# Patient Record
Sex: MALE | Race: WHITE | Employment: FULL TIME | URBAN - METROPOLITAN AREA
[De-identification: names, ages, dates, MRNs, and addresses within clinical notes are randomized per-mention and may not be internally consistent; named-entity substitution may affect disease eponyms.]

---

## 2019-01-02 ENCOUNTER — HOSPITAL ENCOUNTER (OUTPATIENT)
Dept: SURGERY | Age: 59
Discharge: HOME | End: 2019-01-02
Payer: COMMERCIAL

## 2019-01-02 VITALS
WEIGHT: 203 LBS | DIASTOLIC BLOOD PRESSURE: 84 MMHG | RESPIRATION RATE: 17 BRPM | BODY MASS INDEX: 24.72 KG/M2 | OXYGEN SATURATION: 95 % | TEMPERATURE: 98.4 F | HEIGHT: 76 IN | SYSTOLIC BLOOD PRESSURE: 125 MMHG

## 2019-01-02 LAB
ANION GAP SERPL CALC-SCNC: 6 MMOL/L (ref 7–16)
BACTERIA SPEC CULT: ABNORMAL
BUN SERPL-MCNC: 12 MG/DL (ref 6–23)
CALCIUM SERPL-MCNC: 8.7 MG/DL (ref 8.3–10.4)
CHLORIDE SERPL-SCNC: 107 MMOL/L (ref 98–107)
CO2 SERPL-SCNC: 26 MMOL/L (ref 21–32)
CREAT SERPL-MCNC: 0.92 MG/DL (ref 0.8–1.5)
ERYTHROCYTE [DISTWIDTH] IN BLOOD BY AUTOMATED COUNT: 11.9 % (ref 11.9–14.6)
GLUCOSE SERPL-MCNC: 98 MG/DL (ref 65–100)
HCT VFR BLD AUTO: 41.1 % (ref 41.1–50.3)
HGB BLD-MCNC: 13.8 G/DL (ref 13.6–17.2)
MCH RBC QN AUTO: 31.2 PG (ref 26.1–32.9)
MCHC RBC AUTO-ENTMCNC: 33.6 G/DL (ref 31.4–35)
MCV RBC AUTO: 92.8 FL (ref 79.6–97.8)
NRBC # BLD: 0 K/UL (ref 0–0.2)
PLATELET # BLD AUTO: 245 K/UL (ref 150–450)
PMV BLD AUTO: 9 FL (ref 9.4–12.3)
POTASSIUM SERPL-SCNC: 4.1 MMOL/L (ref 3.5–5.1)
RBC # BLD AUTO: 4.43 M/UL (ref 4.23–5.6)
SERVICE CMNT-IMP: ABNORMAL
SODIUM SERPL-SCNC: 139 MMOL/L (ref 136–145)
WBC # BLD AUTO: 6.4 K/UL (ref 4.3–11.1)

## 2019-01-02 PROCEDURE — 87641 MR-STAPH DNA AMP PROBE: CPT

## 2019-01-02 PROCEDURE — 85027 COMPLETE CBC AUTOMATED: CPT

## 2019-01-02 PROCEDURE — 93005 ELECTROCARDIOGRAM TRACING: CPT | Performed by: ANESTHESIOLOGY

## 2019-01-02 PROCEDURE — 80048 BASIC METABOLIC PNL TOTAL CA: CPT

## 2019-01-02 NOTE — PERIOP NOTES
Patient verified name and  Order for consent not found Type 3 surgery,  assessment complete. Labs per surgeon: none Labs per anesthesia protocol: CBC, BMP, MRSA/MSSA (per hospital protocol), type and screen DOS. EK19 NSR Hibiclens and instructions given per hospital policy. Patient provided with and instructed on educational handouts including Guide to Surgery, Pain Management, Hand Hygiene, Blood Transfusion Education, and [place] [facility] Brochure. Patient answered medical/surgical history questions at their best of ability. All prior to admission medications documented in [facility]. Patient instructed to hold all vitamins 7 days prior to surgery and NSAIDS 5 days prior to surgery, patient verbalized understanding. Medications to be held: none Patient instructed to continue previous medications as prescribed prior to surgery and to take the following medications the day of surgery according to anesthesia guidelines with a small sip of water: none. Patient teach back successful and patient demonstrates knowledge of instructions.

## 2019-01-02 NOTE — PERIOP NOTES
Recent Results (from the past 12 hour(s)) METABOLIC PANEL, BASIC Collection Time: 01/02/19 12:08 PM  
Result Value Ref Range Sodium 139 136 - 145 mmol/L Potassium 4.1 3.5 - 5.1 mmol/L Chloride 107 98 - 107 mmol/L  
 CO2 26 21 - 32 mmol/L Anion gap 6 (L) 7 - 16 mmol/L Glucose 98 65 - 100 mg/dL BUN 12 6 - 23 MG/DL Creatinine 0.92 0.8 - 1.5 MG/DL  
 GFR est AA >60 >60 ml/min/1.73m2 GFR est non-AA >60 >60 ml/min/1.73m2 Calcium 8.7 8.3 - 10.4 MG/DL  
CBC W/O DIFF Collection Time: 01/02/19 12:08 PM  
Result Value Ref Range WBC 6.4 4.3 - 11.1 K/uL  
 RBC 4.43 4.23 - 5.6 M/uL  
 HGB 13.8 13.6 - 17.2 g/dL HCT 41.1 41.1 - 50.3 % MCV 92.8 79.6 - 97.8 FL  
 MCH 31.2 26.1 - 32.9 PG  
 MCHC 33.6 31.4 - 35.0 g/dL  
 RDW 11.9 11.9 - 14.6 % PLATELET 275 344 - 894 K/uL MPV 9.0 (L) 9.4 - 12.3 FL ABSOLUTE NRBC 0.00 0.0 - 0.2 K/uL MSSA/MRSA SC BY PCR, NASAL SWAB Collection Time: 01/02/19 12:08 PM  
Result Value Ref Range Special Requests: NO SPECIAL REQUESTS Culture result: (A) MRSA target DNA not detected, SA target DNA detected. A MRSA negative, SA positive test result does not preclude MRSA nasal colonization. Labs reviewed

## 2019-01-02 NOTE — PERIOP NOTES
Nasal swab resulted MSSA. Called Mupirocin to [facility] pharmacy in [facility] on [address]. Called patient back and explained to administer to each nostril twice daily for 5 days. Patient verbalized understanding.

## 2019-01-03 LAB
ATRIAL RATE: 61 BPM
CALCULATED P AXIS, ECG09: 77 DEGREES
CALCULATED R AXIS, ECG10: 75 DEGREES
CALCULATED T AXIS, ECG11: 63 DEGREES
DIAGNOSIS, 93000: NORMAL
P-R INTERVAL, ECG05: 160 MS
Q-T INTERVAL, ECG07: 404 MS
QRS DURATION, ECG06: 88 MS
QTC CALCULATION (BEZET), ECG08: 406 MS
VENTRICULAR RATE, ECG03: 61 BPM

## 2019-01-06 ENCOUNTER — ANESTHESIA EVENT (OUTPATIENT)
Dept: SURGERY | Age: 59
DRG: 470 | End: 2019-01-06
Payer: COMMERCIAL

## 2019-01-06 PROBLEM — M16.9 DEGENERATIVE JOINT DISEASE (DJD) OF HIP: Status: ACTIVE | Noted: 2019-01-06

## 2019-01-07 ENCOUNTER — APPOINTMENT (OUTPATIENT)
Dept: GENERAL RADIOLOGY | Age: 59
DRG: 470 | End: 2019-01-07
Attending: ORTHOPAEDIC SURGERY
Payer: COMMERCIAL

## 2019-01-07 ENCOUNTER — ANESTHESIA (OUTPATIENT)
Dept: SURGERY | Age: 59
DRG: 470 | End: 2019-01-07
Payer: COMMERCIAL

## 2019-01-07 ENCOUNTER — HOSPITAL ENCOUNTER (INPATIENT)
Age: 59
LOS: 2 days | Discharge: HOME HEALTH | DRG: 470 | End: 2019-01-09
Attending: ORTHOPAEDIC SURGERY | Admitting: ORTHOPAEDIC SURGERY
Payer: COMMERCIAL

## 2019-01-07 LAB
ABO + RH BLD: NORMAL
BLOOD BANK CMNT PATIENT-IMP: NORMAL
BLOOD GROUP ANTIBODIES SERPL: NORMAL
INR PPP: 1
PROTHROMBIN TIME: 13.5 SEC (ref 11.7–14.5)
SPECIMEN EXP DATE BLD: NORMAL

## 2019-01-07 PROCEDURE — 77030006835 HC BLD SAW SAG STRY -B: Performed by: ORTHOPAEDIC SURGERY

## 2019-01-07 PROCEDURE — 77030002922 HC SUT FBRWRE ARTH -B: Performed by: ORTHOPAEDIC SURGERY

## 2019-01-07 PROCEDURE — 72170 X-RAY EXAM OF PELVIS: CPT

## 2019-01-07 PROCEDURE — 74011250636 HC RX REV CODE- 250/636: Performed by: ANESTHESIOLOGY

## 2019-01-07 PROCEDURE — 97161 PT EVAL LOW COMPLEX 20 MIN: CPT

## 2019-01-07 PROCEDURE — 77030003665 HC NDL SPN BBMI -A: Performed by: ANESTHESIOLOGY

## 2019-01-07 PROCEDURE — 0SR90JA REPLACEMENT OF RIGHT HIP JOINT WITH SYNTHETIC SUBSTITUTE, UNCEMENTED, OPEN APPROACH: ICD-10-PCS | Performed by: ORTHOPAEDIC SURGERY

## 2019-01-07 PROCEDURE — 77030013727 HC IRR FAN PULSVC ZIMM -B: Performed by: ORTHOPAEDIC SURGERY

## 2019-01-07 PROCEDURE — 74011000250 HC RX REV CODE- 250: Performed by: ORTHOPAEDIC SURGERY

## 2019-01-07 PROCEDURE — 86900 BLOOD TYPING SEROLOGIC ABO: CPT

## 2019-01-07 PROCEDURE — 74011250636 HC RX REV CODE- 250/636

## 2019-01-07 PROCEDURE — 74011250637 HC RX REV CODE- 250/637: Performed by: ANESTHESIOLOGY

## 2019-01-07 PROCEDURE — 74011250637 HC RX REV CODE- 250/637: Performed by: ORTHOPAEDIC SURGERY

## 2019-01-07 PROCEDURE — 65270000029 HC RM PRIVATE

## 2019-01-07 PROCEDURE — 77030008467 HC STPLR SKN COVD -B: Performed by: ORTHOPAEDIC SURGERY

## 2019-01-07 PROCEDURE — 77030029883 HC RETRV SUT ARTHSCP HOFFE BEAT -B: Performed by: ORTHOPAEDIC SURGERY

## 2019-01-07 PROCEDURE — 85610 PROTHROMBIN TIME: CPT

## 2019-01-07 PROCEDURE — 77030020782 HC GWN BAIR PAWS FLX 3M -B: Performed by: ANESTHESIOLOGY

## 2019-01-07 PROCEDURE — 77030031139 HC SUT VCRL2 J&J -A: Performed by: ORTHOPAEDIC SURGERY

## 2019-01-07 PROCEDURE — 74011000250 HC RX REV CODE- 250

## 2019-01-07 PROCEDURE — 74011250636 HC RX REV CODE- 250/636: Performed by: ORTHOPAEDIC SURGERY

## 2019-01-07 PROCEDURE — 77030018836 HC SOL IRR NACL ICUM -A: Performed by: ORTHOPAEDIC SURGERY

## 2019-01-07 PROCEDURE — C1776 JOINT DEVICE (IMPLANTABLE): HCPCS | Performed by: ORTHOPAEDIC SURGERY

## 2019-01-07 PROCEDURE — 76010000171 HC OR TIME 2 TO 2.5 HR INTENSV-TIER 1: Performed by: ORTHOPAEDIC SURGERY

## 2019-01-07 PROCEDURE — 76210000006 HC OR PH I REC 0.5 TO 1 HR: Performed by: ORTHOPAEDIC SURGERY

## 2019-01-07 PROCEDURE — 76060000035 HC ANESTHESIA 2 TO 2.5 HR: Performed by: ORTHOPAEDIC SURGERY

## 2019-01-07 PROCEDURE — 77030020263 HC SOL INJ SOD CL0.9% LFCR 1000ML

## 2019-01-07 PROCEDURE — 77030007880 HC KT SPN EPDRL BBMI -B: Performed by: ANESTHESIOLOGY

## 2019-01-07 PROCEDURE — 36415 COLL VENOUS BLD VENIPUNCTURE: CPT

## 2019-01-07 PROCEDURE — 97530 THERAPEUTIC ACTIVITIES: CPT

## 2019-01-07 PROCEDURE — 74011000258 HC RX REV CODE- 258: Performed by: ORTHOPAEDIC SURGERY

## 2019-01-07 DEVICE — COBALT CHROME 12/14 TAPER FEMORAL                                    HEAD 28MM + 0: Type: IMPLANTABLE DEVICE | Site: HIP | Status: FUNCTIONAL

## 2019-01-07 RX ORDER — HYDROCODONE BITARTRATE AND ACETAMINOPHEN 5; 325 MG/1; MG/1
1 TABLET ORAL AS NEEDED
Status: DISCONTINUED | OUTPATIENT
Start: 2019-01-07 | End: 2019-01-07 | Stop reason: HOSPADM

## 2019-01-07 RX ORDER — HYDROMORPHONE HYDROCHLORIDE 1 MG/ML
1 INJECTION, SOLUTION INTRAMUSCULAR; INTRAVENOUS; SUBCUTANEOUS
Status: DISCONTINUED | OUTPATIENT
Start: 2019-01-07 | End: 2019-01-09 | Stop reason: HOSPADM

## 2019-01-07 RX ORDER — ACETAMINOPHEN 500 MG
1000 TABLET ORAL
Status: DISCONTINUED | OUTPATIENT
Start: 2019-01-07 | End: 2019-01-07 | Stop reason: HOSPADM

## 2019-01-07 RX ORDER — PROPOFOL 10 MG/ML
INJECTION, EMULSION INTRAVENOUS AS NEEDED
Status: DISCONTINUED | OUTPATIENT
Start: 2019-01-07 | End: 2019-01-07 | Stop reason: HOSPADM

## 2019-01-07 RX ORDER — ENOXAPARIN SODIUM 100 MG/ML
40 INJECTION SUBCUTANEOUS EVERY 24 HOURS
Status: DISCONTINUED | OUTPATIENT
Start: 2019-01-08 | End: 2019-01-09 | Stop reason: HOSPADM

## 2019-01-07 RX ORDER — HYDROMORPHONE HYDROCHLORIDE 2 MG/ML
0.5 INJECTION, SOLUTION INTRAMUSCULAR; INTRAVENOUS; SUBCUTANEOUS
Status: DISCONTINUED | OUTPATIENT
Start: 2019-01-07 | End: 2019-01-07 | Stop reason: HOSPADM

## 2019-01-07 RX ORDER — SODIUM CHLORIDE 9 MG/ML
50 INJECTION, SOLUTION INTRAVENOUS CONTINUOUS
Status: DISCONTINUED | OUTPATIENT
Start: 2019-01-07 | End: 2019-01-07 | Stop reason: HOSPADM

## 2019-01-07 RX ORDER — AMOXICILLIN 250 MG
2 CAPSULE ORAL DAILY
Status: DISCONTINUED | OUTPATIENT
Start: 2019-01-08 | End: 2019-01-09 | Stop reason: HOSPADM

## 2019-01-07 RX ORDER — LIDOCAINE HYDROCHLORIDE 10 MG/ML
0.1 INJECTION INFILTRATION; PERINEURAL AS NEEDED
Status: DISCONTINUED | OUTPATIENT
Start: 2019-01-07 | End: 2019-01-07 | Stop reason: HOSPADM

## 2019-01-07 RX ORDER — CEFAZOLIN SODIUM/WATER 2 G/20 ML
2 SYRINGE (ML) INTRAVENOUS EVERY 8 HOURS
Status: COMPLETED | OUTPATIENT
Start: 2019-01-07 | End: 2019-01-08

## 2019-01-07 RX ORDER — CELECOXIB 200 MG/1
200 CAPSULE ORAL
Status: COMPLETED | OUTPATIENT
Start: 2019-01-07 | End: 2019-01-07

## 2019-01-07 RX ORDER — PROPOFOL 10 MG/ML
INJECTION, EMULSION INTRAVENOUS
Status: DISCONTINUED | OUTPATIENT
Start: 2019-01-07 | End: 2019-01-07 | Stop reason: HOSPADM

## 2019-01-07 RX ORDER — SODIUM CHLORIDE 0.9 % (FLUSH) 0.9 %
5-40 SYRINGE (ML) INJECTION AS NEEDED
Status: DISCONTINUED | OUTPATIENT
Start: 2019-01-07 | End: 2019-01-09 | Stop reason: HOSPADM

## 2019-01-07 RX ORDER — NALOXONE HYDROCHLORIDE 0.4 MG/ML
.2-.4 INJECTION, SOLUTION INTRAMUSCULAR; INTRAVENOUS; SUBCUTANEOUS
Status: DISCONTINUED | OUTPATIENT
Start: 2019-01-07 | End: 2019-01-09 | Stop reason: HOSPADM

## 2019-01-07 RX ORDER — DIPHENHYDRAMINE HCL 25 MG
25 CAPSULE ORAL
Status: DISCONTINUED | OUTPATIENT
Start: 2019-01-07 | End: 2019-01-09 | Stop reason: HOSPADM

## 2019-01-07 RX ORDER — CELECOXIB 200 MG/1
200 CAPSULE ORAL EVERY 12 HOURS
Status: DISCONTINUED | OUTPATIENT
Start: 2019-01-07 | End: 2019-01-09 | Stop reason: HOSPADM

## 2019-01-07 RX ORDER — SODIUM CHLORIDE 9 MG/ML
100 INJECTION, SOLUTION INTRAVENOUS CONTINUOUS
Status: DISPENSED | OUTPATIENT
Start: 2019-01-07 | End: 2019-01-09

## 2019-01-07 RX ORDER — ASPIRIN 325 MG
325 TABLET, DELAYED RELEASE (ENTERIC COATED) ORAL EVERY 12 HOURS
Status: DISCONTINUED | OUTPATIENT
Start: 2019-01-07 | End: 2019-01-09 | Stop reason: HOSPADM

## 2019-01-07 RX ORDER — SODIUM CHLORIDE 0.9 % (FLUSH) 0.9 %
5-40 SYRINGE (ML) INJECTION EVERY 8 HOURS
Status: DISCONTINUED | OUTPATIENT
Start: 2019-01-07 | End: 2019-01-09 | Stop reason: HOSPADM

## 2019-01-07 RX ORDER — CEFAZOLIN SODIUM/WATER 2 G/20 ML
2 SYRINGE (ML) INTRAVENOUS ONCE
Status: COMPLETED | OUTPATIENT
Start: 2019-01-07 | End: 2019-01-07

## 2019-01-07 RX ORDER — SODIUM CHLORIDE, SODIUM LACTATE, POTASSIUM CHLORIDE, CALCIUM CHLORIDE 600; 310; 30; 20 MG/100ML; MG/100ML; MG/100ML; MG/100ML
125 INJECTION, SOLUTION INTRAVENOUS CONTINUOUS
Status: DISCONTINUED | OUTPATIENT
Start: 2019-01-07 | End: 2019-01-07 | Stop reason: HOSPADM

## 2019-01-07 RX ORDER — FENTANYL CITRATE 50 UG/ML
100 INJECTION, SOLUTION INTRAMUSCULAR; INTRAVENOUS AS NEEDED
Status: DISCONTINUED | OUTPATIENT
Start: 2019-01-07 | End: 2019-01-07 | Stop reason: HOSPADM

## 2019-01-07 RX ORDER — MIDAZOLAM HYDROCHLORIDE 1 MG/ML
2 INJECTION, SOLUTION INTRAMUSCULAR; INTRAVENOUS
Status: COMPLETED | OUTPATIENT
Start: 2019-01-07 | End: 2019-01-07

## 2019-01-07 RX ORDER — DEXAMETHASONE SODIUM PHOSPHATE 100 MG/10ML
10 INJECTION INTRAMUSCULAR; INTRAVENOUS ONCE
Status: COMPLETED | OUTPATIENT
Start: 2019-01-08 | End: 2019-01-08

## 2019-01-07 RX ORDER — FAMOTIDINE 20 MG/1
20 TABLET, FILM COATED ORAL ONCE
Status: COMPLETED | OUTPATIENT
Start: 2019-01-07 | End: 2019-01-07

## 2019-01-07 RX ORDER — SODIUM CHLORIDE, SODIUM LACTATE, POTASSIUM CHLORIDE, CALCIUM CHLORIDE 600; 310; 30; 20 MG/100ML; MG/100ML; MG/100ML; MG/100ML
150 INJECTION, SOLUTION INTRAVENOUS CONTINUOUS
Status: DISCONTINUED | OUTPATIENT
Start: 2019-01-07 | End: 2019-01-07 | Stop reason: HOSPADM

## 2019-01-07 RX ORDER — HYDROCODONE BITARTRATE AND ACETAMINOPHEN 10; 325 MG/1; MG/1
1 TABLET ORAL
Status: DISCONTINUED | OUTPATIENT
Start: 2019-01-07 | End: 2019-01-09 | Stop reason: HOSPADM

## 2019-01-07 RX ORDER — BUPIVACAINE HYDROCHLORIDE 7.5 MG/ML
INJECTION, SOLUTION EPIDURAL; RETROBULBAR
Status: COMPLETED | OUTPATIENT
Start: 2019-01-07 | End: 2019-01-07

## 2019-01-07 RX ADMIN — PROPOFOL 180 MCG/KG/MIN: 10 INJECTION, EMULSION INTRAVENOUS at 08:36

## 2019-01-07 RX ADMIN — HYDROCODONE BITARTRATE AND ACETAMINOPHEN 1 TABLET: 10; 325 TABLET ORAL at 20:14

## 2019-01-07 RX ADMIN — HYDROMORPHONE HYDROCHLORIDE 0.5 MG: 2 INJECTION, SOLUTION INTRAMUSCULAR; INTRAVENOUS; SUBCUTANEOUS at 12:12

## 2019-01-07 RX ADMIN — HYDROMORPHONE HYDROCHLORIDE 0.5 MG: 2 INJECTION, SOLUTION INTRAMUSCULAR; INTRAVENOUS; SUBCUTANEOUS at 13:12

## 2019-01-07 RX ADMIN — Medication 1 AMPULE: at 20:14

## 2019-01-07 RX ADMIN — TRANEXAMIC ACID 2 G: 1 INJECTION, SOLUTION INTRAVENOUS at 09:20

## 2019-01-07 RX ADMIN — Medication 10 ML: at 23:11

## 2019-01-07 RX ADMIN — Medication 2 G: at 08:36

## 2019-01-07 RX ADMIN — HYDROCODONE BITARTRATE AND ACETAMINOPHEN 1 TABLET: 10; 325 TABLET ORAL at 14:51

## 2019-01-07 RX ADMIN — SODIUM CHLORIDE 100 ML/HR: 900 INJECTION, SOLUTION INTRAVENOUS at 14:52

## 2019-01-07 RX ADMIN — HYDROMORPHONE HYDROCHLORIDE 1 MG: 1 INJECTION, SOLUTION INTRAMUSCULAR; INTRAVENOUS; SUBCUTANEOUS at 23:11

## 2019-01-07 RX ADMIN — Medication 5 ML: at 14:51

## 2019-01-07 RX ADMIN — HYDROMORPHONE HYDROCHLORIDE 1 MG: 1 INJECTION, SOLUTION INTRAMUSCULAR; INTRAVENOUS; SUBCUTANEOUS at 17:27

## 2019-01-07 RX ADMIN — BUPIVACAINE HYDROCHLORIDE 12 MG: 7.5 INJECTION, SOLUTION EPIDURAL; RETROBULBAR at 08:32

## 2019-01-07 RX ADMIN — SODIUM CHLORIDE, SODIUM LACTATE, POTASSIUM CHLORIDE, AND CALCIUM CHLORIDE: 600; 310; 30; 20 INJECTION, SOLUTION INTRAVENOUS at 09:27

## 2019-01-07 RX ADMIN — Medication 3 AMPULE: at 06:31

## 2019-01-07 RX ADMIN — FAMOTIDINE 20 MG: 20 TABLET ORAL at 06:32

## 2019-01-07 RX ADMIN — HYDROMORPHONE HYDROCHLORIDE 0.5 MG: 2 INJECTION, SOLUTION INTRAMUSCULAR; INTRAVENOUS; SUBCUTANEOUS at 11:03

## 2019-01-07 RX ADMIN — PROPOFOL 30 MG: 10 INJECTION, EMULSION INTRAVENOUS at 09:09

## 2019-01-07 RX ADMIN — TRANEXAMIC ACID 1 G: 1 INJECTION, SOLUTION INTRAVENOUS at 10:03

## 2019-01-07 RX ADMIN — Medication 2 G: at 17:27

## 2019-01-07 RX ADMIN — PROPOFOL 50 MG: 10 INJECTION, EMULSION INTRAVENOUS at 08:36

## 2019-01-07 RX ADMIN — CELECOXIB 200 MG: 200 CAPSULE ORAL at 06:33

## 2019-01-07 RX ADMIN — ASPIRIN 325 MG: 325 TABLET, DELAYED RELEASE ORAL at 20:14

## 2019-01-07 RX ADMIN — Medication 10 ML: at 20:14

## 2019-01-07 RX ADMIN — MIDAZOLAM HYDROCHLORIDE 2 MG: 2 INJECTION, SOLUTION INTRAMUSCULAR; INTRAVENOUS at 07:26

## 2019-01-07 RX ADMIN — SODIUM CHLORIDE, SODIUM LACTATE, POTASSIUM CHLORIDE, AND CALCIUM CHLORIDE 125 ML/HR: 600; 310; 30; 20 INJECTION, SOLUTION INTRAVENOUS at 06:33

## 2019-01-07 RX ADMIN — CELECOXIB 200 MG: 200 CAPSULE ORAL at 20:14

## 2019-01-07 NOTE — ANESTHESIA POSTPROCEDURE EVALUATION
Procedure(s): RIGHT HIP ARTHROPLASTY TOTAL. Anesthesia Post Evaluation Multimodal analgesia: multimodal analgesia used between 6 hours prior to anesthesia start to PACU discharge Patient location during evaluation: bedside Level of consciousness: responsive to physical stimuli and awake Pain management: adequate Airway patency: patent Anesthetic complications: no 
Cardiovascular status: acceptable, stable and hemodynamically stable Respiratory status: unassisted, spontaneous ventilation, nonlabored ventilation and acceptable Hydration status: acceptable Post anesthesia nausea and vomiting:  controlled Visit Vitals /68 Pulse (!) 59 Temp 36.9 °C (98.4 °F) Resp 16 Ht 6' 4\" (1.93 m) Wt 89.9 kg (198 lb 3 oz) SpO2 98% BMI 24.12 kg/m²

## 2019-01-07 NOTE — PROGRESS NOTES
Problem: Mobility Impaired (Adult and Pediatric) Goal: *Acute Goals and Plan of Care (Insert Text) Short Term Goals: 1.  Mr. David Dos Santos will move from supine to sit and sit to supine in flat bed with INDEPENDENT within 3 day(s). 2.  Mr. David Dos Santos will transfer from sit to stand and stand to sit with INDEPENDENT  within 3 days. 3.  Mr. David Dos Santos will ambulate with MODIFIED INDEPENDENCE for 250 feet with the least restrictive device within 3 day(s). 4.  Mr. David Dos Santos will verbalize 3/3 spinal precautions to ensure back safety during functional activities within 3 days. ________________________________________________________________________ PHYSICAL THERAPY: Initial Assessment, Treatment Day: Day of Assessment, PM 1/7/2019INPATIENT: Hospital Day: 1 Payor: BLUE CROSS / [place] BLUE [place] BLUE ESSENTIALS BRANDI / Product Type: BRANDI /  
R LE hip precautions NAME/AGE/GENDER: Bandar Chambers is a 63 y.o. male PRIMARY DIAGNOSIS: Primary osteoarthritis of right hip [M16.11] Degenerative joint disease (DJD) of hip Degenerative joint disease (DJD) of hip Procedure(s) (LRB): 
RIGHT HIP ARTHROPLASTY TOTAL (Right) Day of Surgery ICD-10: Treatment Diagnosis:  
 · Other abnormalities of gait and mobility (R26.89) Precaution/Allergies: 
Iodine ASSESSMENT:  
Mr. David Dos Santos presents supine in bed with friends at bedside, pleasant and agreeable for PT assessment. He transitioned to sit with CGA and cueing to maintain hip precautions. He stood with CGA and ambulated 100' with RW and CGA with cueing for sequencing. Patient felt lightheaded after ambulation, PCT checked BP and it was WNL. Patient has declined in functional mobility following undergoing above surgery. Mr. David Dos Santos would benefit from skilled physical therapy (medically necessary) to address his deficits and maximize his function.   
Initiated treatment to include ambulation into restroom, patient required mod cueing for correct technique. Tends to be impulsive at times, requires cueing for safety. Educated patient in hip precautions and he expressed understanding. Patient also perform AROM R LE. Will continue to work towards goals. This section established at most recent assessment PROBLEM LIST (Impairments causing functional limitations): 1. Decreased Strength 2. Decreased ADL/Functional Activities 3. Decreased Transfer Abilities 4. Decreased Ambulation Ability/Technique 5. Decreased Balance 6. Decreased Activity Tolerance 7. Decreased Knowledge of Precautions 8. Decreased [place] with Home Exercise Program 
 INTERVENTIONS PLANNED: (Benefits and precautions of physical therapy have been discussed with the patient.) 1. Balance Exercise 2. Bed Mobility 3. Home Exercise Program (HEP) 4. Therapeutic Activites 5. Therapeutic Exercise/Strengthening 6. Transfer Training 7. education TREATMENT PLAN: Frequency/Duration: twice daily for duration of hospital stay Rehabilitation Potential For Stated Goals: Excellent RECOMMENDED REHABILITATION/EQUIPMENT: (at time of discharge pending progress): Due to the probability of continued deficits (see above) this patient will likely need continued skilled physical therapy after discharge. Equipment:  
? None at this time HISTORY:  
History of Present Injury/Illness (Reason for Referral): Admitted for above surgery. Past Medical History/Comorbidities:  
Mr. Loreto Grigsby  has a past medical history of Arthritis, Asthma, and Chronic pain. Mr. Loreto Grigsby  has a past surgical history that includes hx tonsil and adenoidectomy. Social History/Living Environment:  
Home Environment: Private residence # Steps to Enter: 1 One/Two Story Residence: One story Living Alone: Yes Support Systems: Friends \ neighbors Patient Expects to be Discharged to[de-identified] Private residence Current DME Used/Available at Home: Walker, rolling Prior Level of Function/Work/Activity: 
Lives at home alone. Independent in home and community. Number of Personal Factors/Comorbidities that affect the Plan of Care: 1-2: MODERATE COMPLEXITY EXAMINATION:  
Most Recent Physical Functioning:  
Gross Assessment: 
AROM: Generally decreased, functional 
Strength: Generally decreased, functional 
         
  
Posture: 
  
Balance: 
Sitting: Intact Standing: Impaired Standing - Static: Good Standing - Dynamic : Fair Bed Mobility: 
Supine to Sit: Contact guard assistance Scooting: Contact guard assistance Wheelchair Mobility: 
  
Transfers: 
Sit to Stand: Contact guard assistance Stand to Sit: Contact guard assistance Bed to Chair: Contact guard assistance Gait: 
Right Side Weight Bearing: As tolerated Speed/Leny: Slow Step Length: Left shortened;Right shortened Gait Abnormalities: Circumduction Distance (ft): 100 Feet (ft) Assistive Device: Walker, rolling Ambulation - Level of Assistance: Contact guard assistance Interventions: Safety awareness training;Verbal cues Body Structures Involved: 1. Bones 2. Joints 3. Muscles 4. Ligaments Body Functions Affected: 1. Sensory/Pain 2. Movement Related 3. Skin Related Activities and Participation Affected: 1. Mobility 2. Self Care 3. Domestic Life 4. Community, Social and [place] Number of elements that affect the Plan of Care: 4+: HIGH COMPLEXITY CLINICAL PRESENTATION:  
Presentation: Evolving clinical presentation with changing clinical characteristics: MODERATE COMPLEXITY CLINICAL DECISION MAKIN \A Chronology of [place] [facility]\"" [address] [address] AM-PAC 6 Clicks Basic Mobility Inpatient Short Form How much difficulty does the patient currently have. .. Unable A Lot A Little None 1. Turning over in bed (including adjusting bedclothes, sheets and blankets)? [] 1   [] 2   [x] 3   [] 4  
2.   Sitting down on and standing up from a chair with arms ( e.g., wheelchair, bedside commode, etc.)   [] 1   [] 2   [x] 3   [] 4  
3. Moving from lying on back to sitting on the side of the bed? [] 1   [] 2   [x] 3   [] 4 How much help from another person does the patient currently need. .. Total A Lot A Little None 4. Moving to and from a bed to a chair (including a wheelchair)? [] 1   [] 2   [x] 3   [] 4  
5. Need to walk in hospital room? [] 1   [] 2   [x] 3   [] 4  
6. Climbing 3-5 steps with a railing? [] 1   [] 2   [x] 3   [] 4  
© 2007, Trustees of [facility] [employer], under license to [employer]. All rights reserved Score:  Initial: 18 Most Recent: X (Date: -- ) Interpretation of Tool:  Represents activities that are increasingly more difficult (i.e. Bed mobility, Transfers, Gait). Score 24 23 22-20 19-15 14-10 9-7 6 Modifier CH CI CJ CK CL CM CN   
 
? Mobility - Walking and Moving Around:  
  - CURRENT STATUS: CK - 40%-59% impaired, limited or restricted  - GOAL STATUS: CJ - 20%-39% impaired, limited or restricted  - D/C STATUS:  ---------------To be determined--------------- Payor: BLUE CROSS / [place] BLUE ESSENTIALS BRANDI / Product Type: BRANDI /   
 
Medical Necessity:    
· Patient is expected to demonstrate progress in strength, range of motion, balance, coordination and functional technique to increase independence with   and improve safety during all functional mobility. Reason for Services/Other Comments: 
· Patient continues to require skilled intervention due to decline in functional mobility. Use of outcome tool(s) and clinical judgement create a POC that gives a: Clear prediction of patient's progress: LOW COMPLEXITY  
  
 
 
 
TREATMENT:  
(In addition to Assessment/Re-Assessment sessions the following treatments were rendered) Pre-treatment Symptoms/Complaints:   
Pain: Initial:  
Pain Intensity 1: 5 Pain Location 1: Hip Pain Orientation 1: Right Pain Intervention(s) 1: Repositioned  Post Session:  Unchanged. Therapeutic Activity: (    10 minutes): Therapeutic activities including R LE AROM, Ambulation on level ground and manueverinig RW in and out of restroom and education in hip precautions to improve mobility, strength, balance and coordination. Required moderate Safety awareness training;Verbal cues to promote safe technique during mobility. DATE: 1/07/19 Ambulation Hip Flexion [address] Knee Squeezes Ankle DF/PF 2x10 AB Hip AB/AD long sit x10 AAR Key:  A=active, AA=active assisted, P=passive, B=bilaterally, R=right, L=left DF=dorsiflexion, PF=plantarflexion Braces/Orthotics/Lines/Etc:  
· IV 
· O2 Device: Room air Treatment/Session Assessment:   
· Response to Treatment:  Pleasant and cooperative. · Interdisciplinary Collaboration:  
o Physical Therapist 
o Registered Nurse 
o Certified Nursing Assistant/Patient Care Technician · After treatment position/precautions:  
o Up in chair 
o Bed/Chair-wheels locked 
o Call light within reach 
o RN notified 
o Visitors at bedside · Compliance with Program/Exercises: Will assess as treatment progresses · Recommendations/Intent for next treatment session: \"Next visit will focus on advancements to more challenging activities and reduction in assistance provided\". Total Treatment Duration: PT Patient Time In/Time Out Time In: 9907 Time Out: 8079 Lorenz Epley, PT, DPT

## 2019-01-07 NOTE — PROGRESS NOTES
TRANSFER - IN REPORT: 
 
Verbal report received from MARCELLA Potts on Ledon English  being received from PACU for routine progression of care Report consisted of patients Situation, Background, Assessment and  
Recommendations(SBAR). Information from the following report(s) SBAR, Kardex, OR Summary, Procedure Summary, Intake/Output, MAR and Recent Results was reviewed with the receiving nurse. Opportunity for questions and clarification was provided. Assessment completed upon patients arrival to unit and care assumed.

## 2019-01-07 NOTE — PROGRESS NOTES
01/07/19 1410 Dual Skin Pressure Injury Assessment Dual Skin Pressure Injury Assessment WDL Second Care Provider (Based on [address] [place] [address]) Sherman Lowery [facility]

## 2019-01-07 NOTE — ROUTINE PROCESS
TRANSFER - OUT REPORT: 
 
Verbal report given to Uma Sheth RN on Venkata Madera  being [facility] to [id number] for routine post - op Report consisted of patients Situation, Background, Assessment and  
Recommendations(SBAR). Information from the following report(s) SBAR, Kardex, OR Summary, Procedure Summary, Intake/Output and MAR was reviewed with the receiving nurse. Lines:  
Peripheral IV 01/07/19 Posterior;Right Hand (Active) Site Assessment Clean, dry, & intact 1/7/2019 10:30 AM  
Phlebitis Assessment 0 1/7/2019 10:30 AM  
Infiltration Assessment 0 1/7/2019 10:30 AM  
Dressing Status Clean, dry, & intact 1/7/2019 10:30 AM  
Dressing Type Transparent;Tape 1/7/2019 10:30 AM  
Hub Color/Line Status Infusing 1/7/2019 11:03 AM  
Alcohol Cap Used No 1/7/2019 11:03 AM  
  
 
Opportunity for questions and clarification was provided. Patient transported with room air. VTE prophylaxis orders have been written for Venkata Madera. Patient and family given floor number and nurses name. Family updated re: pt status after security code verified.

## 2019-01-07 NOTE — H&P
[facility] History and physical 
 
Subjective Problem List :.  
 
1. Bilateral hip pain. Right hip DJD 2. Right knee pain. This patient presents today for evaluation of right hip pain. The patient comes in today for evaluation, history and physical, and surgical consent signing. The surgical procedure was reviewed in detail with the patient. The risks, including but not limited to anesthesia, infection, deep vein thrombosis, pulmonary embolus, injury to vessels, tendons, and nerves, paralysis, stroke, heart attack, loss of limb, and death were discussed. The patient understands the post operative course and all questions were answered. No guarantees are made and all alternatives are given. The patient wishes to proceed with the surgery. Appropriate literature and relevant material was reviewed with the patient. Surgical procedure: right total hip replacement Allergies:  iodine, shellfish. Family health history: cancer-both parents and sister, heart disease-father, diabetes-father. Apple Ocampo Major events: None reported. Apple Ocampo Ongoing medical problems: osteoarthritis. Preventive care: PCP: Dr. Shaista Oviedo. Apple Ocampo Social history: Patient denies tobacco use and does use EtOH weekly. Patient is single. Apple Ocampo REVIEW OF SYSTEMS:.  
 
General: Denies weight change, generally healthy,  change in strength or exercise tolerance. Apple Ocampo Head: Denies headaches,  vertigo,  injury. Apple Ocampo Eyes: Patient wears reading glasses. Apple Ocampo Ears:  Denies change in hearing,  tinnitus, bleeding, vertigo. Apple Ocampo Nose: Denies epistaxis,  coryza, obstruction,  discharge. Apple Ocampo Mouth: Denies dental difficulties,  gingival bleeding,  use of dentures. Apple Ocampo Chest: Denies dyspnea, wheezing, hemoptysis, cough. Apple Ocampo Heart: Denies chest pains,  palpitations, syncope,  orthopnea. Apple Ocampo Abdomen: Denies change in appetite,  dysphagia, abdominal pains, bowel habit changes, emesis, melena. Apple Ocampo : Denies urinary urgency,  dysuria, change in nature of urine. [facility] Neurologic: Denies weakness, denies tremor,  seizures, changes in mentation,  ataxia. Psychiatric: Denies depressive symptoms, changes in sleep habits,  changes in thought content. Valentino Oxford Objective Vital Signs: Height 77 inches; Weight 202 lbs; /80 mmHg; Temp 97.6 F; Pulse 60 bpm; Oxygen Saturation 97 %. Valentino Oxford Patient is a 63year old male who appears his given age and is in no apparent distress. Oriented to person, place, and time. Mood and affect are appropriate for age and situation. Assessment of respiratory effort reveals even and nonlabored respirations. GEN:NAD Lungs clear to auscultation bilaterally. Heart rate regular without murmur heard to auscultation. The patient exhibits antalgic reciprocal gait, and is able to get onto and off of the examination table. AP Pelvis x-ray (CPT 47265) taken 8-28-18 reveal: severe bilateral joint space narrowing-severe. Right hip appears to have had an old femoral neck fracture or similar appearing abnormality. Extensive DJD changes with flattening of the femoral head, sclerotic changes and  marginal osteophytes. Valentino Oxford Right Hip Examination:. Inspection reveals no external signs of injury. Palpation reveals tenderness to groin area. Right hip has limited ROM. Right hip has 85 flexion. Right hip has -5 external rotation. Right hip has 10 degrees internal rotation. No extension contractor. Vascular: Peripheral pulses normal 2/2 lower extremities. Valentino Oxford Neurologic: Sensation is intact and symmetrical in all dermatomes lower extremities. Deep tendon reflexes are normal.  
 
No ankle clonus and negative Babinski's reflex. Coordination is good. Assessment Plan We discussed the pathophysiology of the diagnosis and options for treatment. We reviewed the conservative treatment options in detail. We discussed the surgical option(s) (right total hip replacement). We have talked about the complications of surgery, including the possibility of damage to nerves, arteries, vessels and tendons, bleeding, infection, the possibility of sustaining medical problems, even death. We have talked about the possibility that the condition may not improve after surgery  or that it could actually be worse. The patient seems to understand and accept these possible complications. The patient understands and wishes to proceed with surgery at this time. Informed surgical consent obtained. Surgery will be performed at [facility] on 1-7[facility]18. The patient was instructed to take a 325 mg Aspirin for one month after surgery. KTAHIE restrictions were discussed in detail and the patient verbalized understanding. It will be medically necessary for this patient to have a BSC and walker post op.  
 
ll questions answered at this time. The patient knows to contact the office with any questions or concerns. VERIFICATION OF ANCILLARY DOCUMENTATION: The portions of the chart completed by ancillary personnel were reviewed by the physician. Apple Ocampo RTC: post-op . Patient takes no medications

## 2019-01-07 NOTE — BRIEF OP NOTE
BRIEF OPERATIVE NOTE Date of Procedure: 1/7/2019 Preoperative Diagnosis: Primary osteoarthritis of right hip [M16.11] Postoperative Diagnosis: Primary osteoarthritis of right hip Procedure(s): RIGHT HIP ARTHROPLASTY TOTAL Surgeon(s) and Role: 
   Nathen Tan MD - Primary Lurdes Ford MD - Assisting Surgical Assistant: see above Surgical Staff: 
Circ-1: Isaac Estrada RN Scrub Tech-1: Pura Ritchie Scrub Tech-2: Manav Daily Event Time In Time Out Incision Start 1734 Incision Close Anesthesia: Spinal  
Estimated Blood Loss: 300 ml Specimens: * No specimens in log * Findings: as above Complications: none noted Implants:  
Implant Name Type Inv. Item Serial No.  Lot No. LRB No. Used Action SHELL TI PLASMA   -415 [employer] [id number] Right 1 Implanted STEM STANDARD TI/HA   -471 [employer] [id number] Right 1 Implanted HEAD FEM 12/14 D 28 +0 COCR -- SYNERGY - [id number]  HEAD FEM 12/14 D 28 +0 COCR -- SYNERGY  [employer] [id number] Right 1 Implanted XLPE INSERT     [id number] Right 1 Implanted

## 2019-01-07 NOTE — H&P
History and Physical Updated with no interval change. Soledad Clayton MD History and Physical Updated with no interval change.  Soledad Clayton MD

## 2019-01-07 NOTE — ANESTHESIA PROCEDURE NOTES
Spinal Block Start time: 1/7/2019 8:27 AM 
End time: 1/7/2019 8:32 AM 
Performed by: Sarah Ling MD 
Authorized by: Sarah Ling MD  
 
Pre-procedure: Indications: primary anesthetic  Preanesthetic Checklist: patient identified, risks and benefits discussed, anesthesia consent, patient being monitored and timeout performed Timeout Time: 08:27 Spinal Block:  
Patient Position:  Seated Prep Region:  Lumbar Prep: chlorhexidine Location:  L3-4 Technique:  Single shot Local Dose (mL):  3 Needle:  
Needle Type:  Pencan Needle Gauge:  25 G Attempts:  1 Events: CSF confirmed, no blood with aspiration and no paresthesia Assessment: 
Insertion:  Uncomplicated Patient tolerance:  Patient tolerated the procedure well with no immediate complications All needles out intact, procedure tolerated well without problems

## 2019-01-07 NOTE — PERIOP NOTES
Dr. Sherrill Rich made aware of patient's blood pressure, orders for 500ml bolus of Lactated Ringers given at this time.

## 2019-01-07 NOTE — ANESTHESIA PREPROCEDURE EVALUATION
Anesthetic History No history of anesthetic complications Review of Systems / Medical History Patient summary reviewed and pertinent labs reviewed Pulmonary Smoker (quit 10 years.) Asthma Neuro/Psych Within defined limits Cardiovascular Exercise tolerance: >4 METS 
  
GI/Hepatic/Renal 
Within defined limits Endo/Other Arthritis Other Findings Physical Exam 
 
Airway Mallampati: II 
TM Distance: 4 - 6 cm Neck ROM: normal range of motion Mouth opening: Normal 
 
 Cardiovascular Regular rate and rhythm,  S1 and S2 normal,  no murmur, click, rub, or gallop Rhythm: regular Rate: normal 
 
 
 
 Dental 
 
Dentition: Caps/crowns Pulmonary Breath sounds clear to auscultation Abdominal 
 
 
 
 Other Findings Anesthetic Plan ASA: 2 Anesthesia type: spinal 
 
 
 
 
 
Anesthetic plan and risks discussed with: Patient Friend present

## 2019-01-07 NOTE — OP NOTES
[facility] REPORT    Wyatt Wong  MR#: [id number]  : 1960  ACCOUNT #: [de-identified]   DATE OF SERVICE: 2019    The patient is a 21-year-old gentleman with a long history of disabling right hip osteoarthritis. He has been evaluated in the past and decided to proceed with total hip arthroplasty. We had a long discussion and he appeared to understand the significant risks include infection, dislocation, loosening of components which may necessitate further surgery, the risk of blood clots, blood loss, risk of associated death, nerve damage, leg length inequality and other less common, but serious complication may occur. Informed consent was obtained. PREOPERATIVE DIAGNOSIS:  Severe right hip degenerative joint disease. POSTOPERATIVE DIAGNOSIS:  Severe right hip degenerative joint disease. PROCEDURE PERFORMED:  Right total hip arthroplasty with Charyl Ramses and Nephew dual mobility components press fit. Please note, because of the complexity of the patient's deformity, it was felt that a trained surgeon was necessary as an assistant, so Dr. Gudelia Crawford did assist with this procedure. PRIMARY SURGEON:  Hola Paulson MD    ASSISTANT SURGEON:  Dr. Yosvany Chew:  The patient was seen in the preoperative area. His leg was marked. His chart was updated. Using operating room #7, successful spinal anesthetic was introduced. He was placed in right lateral decubitus position with all prominences well padded on a pegboard with a gel liner. The hip was prepped and draped in a sterile field. He received 2 grams of Ancef preop as well as 2 grams of tranexamic acid. A timeout was affected by the surgeon, was confirmed by the operative team.  We utilized a posterior approach to the hip with an incision centered over the lateral trochanter, curving gently in the buttock.   Skin incision was carried down to the  tendon of the tensor fascia which  was incised in line with the skin incision. The hip was held in external rotation and noted to be markedly tight. We then removed the external rotators with cautery and tagged these for repair. We tagged the capsule and tagged these limbs for repair. We then were able to dislocate the hip. There were significant osteophytes circumferentially. We utilized the guide from the [facility] instrumentation, made our femoral neck osteotomy, removed the femoral  head to the back table. There were significant osteophytes surrounding the acetabulum. These were remved with a curved  Osteotome. We thoroughly released these as well as posterior capsule and anterior capsule in this area to allow more mobility of the hip. There were significant osteophytes off the femoral neck were also removed with a curved osteotome. We then reamed up to a size 61 cup with good fill and good stability. We placed in the line to line 61 cup. We then broached and reamed up to a size 8, so we utilized a normal offset #8 stem with a 61 cup. All the trial components were on the back table. We placed these permanent components in place without difficulty. Reduced the hip. It was found to be stable throughout significant range of motion. We thoroughly irrigated the wound. We closed the external rotators and capsule back and drilled holes in the posterior proximal portion of the greater trochanter utilizing a drill and a Hewson suture passer. We then closed the tensor fascia with a running suture of #1 Vicryl, deep with 0 Vicryl, 2-0 Vicryl, clips to the skin. ESTIMATED BLOOD LOSS:  300 mL. COMPLICATIONS:  None. SPECIMENS REMOVED:  No specimens. IMPLA        The components are noted in the brief op note. He will be admitted to our service.       Bert Vargas MD       DEL / DAY  D: 01/07/2019 12:00     T: 01/07/2019 12:45  JOB #: [id number]

## 2019-01-08 LAB — HGB BLD-MCNC: 12.1 G/DL (ref 13.6–17.2)

## 2019-01-08 PROCEDURE — 85018 HEMOGLOBIN: CPT

## 2019-01-08 PROCEDURE — 97535 SELF CARE MNGMENT TRAINING: CPT

## 2019-01-08 PROCEDURE — 74011250637 HC RX REV CODE- 250/637: Performed by: ORTHOPAEDIC SURGERY

## 2019-01-08 PROCEDURE — 65270000029 HC RM PRIVATE

## 2019-01-08 PROCEDURE — 36415 COLL VENOUS BLD VENIPUNCTURE: CPT

## 2019-01-08 PROCEDURE — 97165 OT EVAL LOW COMPLEX 30 MIN: CPT

## 2019-01-08 PROCEDURE — 74011250636 HC RX REV CODE- 250/636: Performed by: ORTHOPAEDIC SURGERY

## 2019-01-08 PROCEDURE — 97530 THERAPEUTIC ACTIVITIES: CPT

## 2019-01-08 PROCEDURE — 97110 THERAPEUTIC EXERCISES: CPT

## 2019-01-08 RX ADMIN — HYDROCODONE BITARTRATE AND ACETAMINOPHEN 1 TABLET: 10; 325 TABLET ORAL at 06:47

## 2019-01-08 RX ADMIN — ENOXAPARIN SODIUM 40 MG: 40 INJECTION SUBCUTANEOUS at 08:22

## 2019-01-08 RX ADMIN — DEXAMETHASONE SODIUM PHOSPHATE 10 MG: 10 INJECTION INTRAMUSCULAR; INTRAVENOUS at 13:12

## 2019-01-08 RX ADMIN — HYDROCODONE BITARTRATE AND ACETAMINOPHEN 1 TABLET: 10; 325 TABLET ORAL at 23:13

## 2019-01-08 RX ADMIN — Medication 1 AMPULE: at 08:22

## 2019-01-08 RX ADMIN — ASPIRIN 325 MG: 325 TABLET, DELAYED RELEASE ORAL at 22:28

## 2019-01-08 RX ADMIN — CELECOXIB 200 MG: 200 CAPSULE ORAL at 22:28

## 2019-01-08 RX ADMIN — HYDROCODONE BITARTRATE AND ACETAMINOPHEN 1 TABLET: 10; 325 TABLET ORAL at 10:43

## 2019-01-08 RX ADMIN — Medication 10 ML: at 04:39

## 2019-01-08 RX ADMIN — Medication 2 G: at 01:27

## 2019-01-08 RX ADMIN — Medication 1 AMPULE: at 22:31

## 2019-01-08 RX ADMIN — ASPIRIN 325 MG: 325 TABLET, DELAYED RELEASE ORAL at 08:21

## 2019-01-08 RX ADMIN — STANDARDIZED SENNA CONCENTRATE AND DOCUSATE SODIUM 2 TABLET: 8.6; 5 TABLET, FILM COATED ORAL at 08:22

## 2019-01-08 RX ADMIN — CELECOXIB 200 MG: 200 CAPSULE ORAL at 08:21

## 2019-01-08 RX ADMIN — Medication 10 ML: at 13:12

## 2019-01-08 RX ADMIN — HYDROCODONE BITARTRATE AND ACETAMINOPHEN 1 TABLET: 10; 325 TABLET ORAL at 19:04

## 2019-01-08 RX ADMIN — Medication 5 ML: at 22:28

## 2019-01-08 RX ADMIN — HYDROMORPHONE HYDROCHLORIDE 1 MG: 1 INJECTION, SOLUTION INTRAMUSCULAR; INTRAVENOUS; SUBCUTANEOUS at 04:39

## 2019-01-08 NOTE — PROGRESS NOTES
Met with pt at bedside, pt is alert and oriented x3, states lives alone in own 1 level house with 1 step to enter, states plans to return home at DC, requesting [place] at [place], agreeable by MD, referral and orders sent to [facility] for PT/OT. Per MD notes pt needs BSC and Walker for home use but pt states he has purchased these prior to admission and will not need equipment at this time. CM will continue to follow for [place] needs. Care Management Interventions PCP Verified by CM: Yes Transition of Care Consult (CM Consult): Discharge Planning, Home Health [address] No 
Reason Outside [place]: Physician referred to specific agency([facility]) Current Support Network: Own Home, Lives Alone Confirm Follow Up Transport: Family Plan discussed with Pt/Family/Caregiver: Yes Freedom of Choice Offered: Yes Discharge Location Discharge Placement: Home with home health

## 2019-01-08 NOTE — PROGRESS NOTES
Pt's temp conts to increase. Per Dr. Adelso Santiago, he does not want pt to have tylenol until temp reaches 102. Pt to have PRN norco when due. Will cont to monitor and educate on IS and deep breathing.

## 2019-01-08 NOTE — PROGRESS NOTES
Problem: Self Care Deficits Care Plan (Adult) Goal: *Acute Goals and Plan of Care (Insert Text) 1. Patient will verbalize and demonstrate understanding of hip precautions with 100% accuracy during ADL. 2. Patient will complete functional transfers with modified independence and adaptive equipment as needed. 3. Patient will complete lower body bathing and dressing with setup and adaptive equipment as needed. 4. Patient will complete toileting with supervision. 5. Patient will tolerate at least 20 minutes of BUE therapeutic exercises to increase strength in BUE to aid in functional transfers. 6. Patient will tolerate at least 20 minutes of OT treatment with no rest breaks to increase activity tolerance for ADLs. Timeframe: 7 visits OCCUPATIONAL THERAPY: Initial Assessment, Daily Note and Treatment Day: 1st 1/8/2019INPATIENT: Hospital Day: 2 Payor: Lillie Erazo / Plan[place] BLUE CR[place] BLUE ESSENTIALS BRANDI / Product Type: BRANDI /  
  
NAME/AGE/GENDER: Dale Child is a 63 y.o. male PRIMARY DIAGNOSIS:  Primary osteoarthritis of right hip [M16.11] Degenerative joint disease (DJD) of hip Degenerative joint disease (DJD) of hip Procedure(s) (LRB): 
RIGHT HIP ARTHROPLASTY TOTAL (Right) 1 Day Post-Op ICD-10: Treatment Diagnosis:  
 · Pain in Right Hip (M25.551) · Stiffness of Right Hip, Not elsewhere classified (M25.651) Precautions/Allergies: WBAT RLE Hip precautions Iodine ASSESSMENT:  
Mr. Mine Rivera is a 63year old male who is now s/p R KATHIE and is WBAT in RLE. At baseline patient lives alone and is typically independent with ADLs, although he admits to having trouble with LB dressing recently due to hip pain. He drives and works in sales. Patient seated in chair upon arrival, agreeable to OT evaluation. Reports pain 2/10 in hip. Alert and oriented. BUE assessment reveals ROM, strength, coordination are all WNL.  Educated patient on hip precautions and he has a good understanding. Demonstrates scooting and sit to stand with CGA and RW. Treatment initiated to include introduction of adaptive equipment for LB ADLs including long handle shoe horn, long handle sponge, reacher, and sock aid. Patient practiced LB dressing with adaptive equipment with assistance below provided along with visual and verbal cues. Patient is currently functioning below his independent baseline but is motivated to participate in therapy to regain his independence. Will plan to follow for acute OT for duration of hospital stay. This section established at most recent assessment PROBLEM LIST (Impairments causing functional limitations): 1. Decreased ADL/Functional Activities 2. Decreased Transfer Abilities 3. Decreased Ambulation Ability/Technique 4. Decreased Balance 5. Increased Pain 6. Decreased Activity Tolerance 7. Decreased Flexibility/Joint Mobility 8. Decreased Knowledge of Precautions INTERVENTIONS PLANNED: (Benefits and precautions of occupational therapy have been discussed with the patient.) 1. Activities of daily living training 2. Adaptive equipment training 3. Group therapy 4. Therapeutic activity 5. Therapeutic exercise TREATMENT PLAN: Frequency/Duration: Follow patient 6x/ week to address above goals. Rehabilitation Potential For Stated Goals: Good RECOMMENDED REHABILITATION/EQUIPMENT: (at time of discharge pending progress): Due to the probability of continued deficits (see above) this patient will likely need continued skilled occupational therapy after discharge. Equipment: ? TBD  
    
 
 
 
OCCUPATIONAL PROFILE AND HISTORY:  
History of Present Injury/Illness (Reason for Referral): 
R KATHIE Past Medical History/Comorbidities:  
Mr. David Dos Santos  has a past medical history of Arthritis, Asthma, and Chronic pain.   Mr. David Dos Santos  has a past surgical history that includes hx tonsil and adenoidectomy and RIGHT HIP ARTHROPLASTY TOTAL (Right, 1/7/2019). Social History/Living Environment:  
Home Environment: Private residence # Steps to Enter: 1 One/Two Story Residence: One story Living Alone: Yes Support Systems: Friends \ neighbors Patient Expects to be Discharged to[de-identified] Private residence Current DME Used/Available at Home: Raised toilet seat Tub or Shower Type: Shower Prior Level of Function/Work/Activity: At baseline patient lives alone and is typically independent with ADLs, although he admits to having trouble with LB dressing recently due to hip pain. He drives and works in sales. Number of Personal Factors/Comorbidities that affect the Plan of Care: Brief history (0):  LOW COMPLEXITY ASSESSMENT OF OCCUPATIONAL PERFORMANCE[de-identified]  
Activities of Daily Living:  
Basic ADLs (From Assessment) Complex ADLs (From Assessment) Feeding: Setup Oral Facial Hygiene/Grooming: Setup Bathing: Minimum assistance Upper Body Dressing: Setup Lower Body Dressing: Minimum assistance Toileting: Minimum assistance Instrumental ADL Meal Preparation: Moderate assistance Homemaking: Moderate assistance Medication Management: Independent Financial Management: Independent Grooming/Bathing/Dressing Activities of Daily Living Cognitive Retraining Safety/Judgement: Awareness of environment; Fall prevention Lower Body Dressing Assistance Socks: Minimum assistance; Compensatory technique training Leg Crossed Method Used: No 
Position Performed: Seated in chair Cues: Don;Doff;Verbal cues provided;Visual cues provided Adaptive Equipment Used: Reacher;Sock aid; [address]; Long handled shoe horn Bed/Mat Mobility Sit to Stand: Contact guard assistance Scooting: Supervision Most Recent Physical Functioning:  
Gross Assessment: 
AROM: Within functional limits Strength: Within functional limits Posture: 
  
Balance: 
Sitting: Intact Standing: Impaired Standing - Static: Good Standing - Dynamic : Fair Bed Mobility: 
Scooting: Supervision Wheelchair Mobility: 
  
Transfers: 
Sit to Stand: Contact guard assistance Stand to Sit: Contact guard assistance Patient Vitals for the past 6 hrs: 
 BP BP Patient Position SpO2 Pulse 01/08/19 0909 130/82 At rest 97 % 80 Mental Status Neurologic State: Alert Orientation Level: Oriented X4 Cognition: Follows commands Perception: Appears intact Perseveration: No perseveration noted Safety/Judgement: Awareness of environment, Fall prevention Physical Skills Involved: 1. Range of Motion 2. Balance 3. Activity Tolerance 4. Pain (acute) Cognitive Skills Affected (resulting in the inability to perform in a timely and safe manner): 1. None Psychosocial Skills Affected: 1. Habits/Routines 2. Environmental Adaptation 3. Social Roles Number of elements that affect the Plan of Care: 5+:  HIGH COMPLEXITY CLINICAL DECISION MAKING:  
[facility] AM-PAC 6 Clicks Daily Activity Inpatient Short Form How much help from another person does the patient currently need. .. Total A Lot A Little None 1. Putting on and taking off regular lower body clothing? [] 1   [x] 2   [] 3   [] 4  
2. Bathing (including washing, rinsing, drying)? [] 1   [x] 2   [] 3   [] 4  
3. Toileting, which includes using toilet, bedpan or urinal?   [] 1   [x] 2   [] 3   [] 4  
4. Putting on and taking off regular upper body clothing? [] 1   [] 2   [x] 3   [] 4  
5. Taking care of personal grooming such as brushing teeth? [] 1   [] 2   [x] 3   [] 4  
6. Eating meals? [] 1   [] 2   [x] 3   [] 4  
© 2007, Trustees of [facility], under license to [employer]. All rights reserved Score:  Initial: 15 Most Recent: X (Date: -- ) Interpretation of Tool:  Represents activities that are increasingly more difficult (i.e. Bed mobility, Transfers, Gait). Score 24 23 22-20 19-15 14-10 9-7 6 Modifier CH CI CJ CK CL CM CN   
 
? Self Care:  
  - CURRENT STATUS: CK - 40%-59% impaired, limited or restricted  - GOAL STATUS: CJ - 20%-39% impaired, limited or restricted  - D/C STATUS:  ---------------To be determined--------------- Payor: BLUE CROSS / [place] BLUE ESSENTIALS BRANDI / Product Type: BRANDI /   
 
Medical Necessity:    
· Patient demonstrates good rehab potential due to higher previous functional level. Reason for Services/Other Comments: 
· Patient continues to require present interventions due to patient's inability to care for self while maintaining hip precautions. Use of outcome tool(s) and clinical judgement create a POC that gives a: MODERATE COMPLEXITY  
 
 
 
TREATMENT:  
(In addition to Assessment/Re-Assessment sessions the following treatments were rendered) Pre-treatment Symptoms/Complaints:   
Pain: Initial:  
Pain Intensity 1: 2  Post Session:  None Self Care: (8 minutes): Procedure(s) (per grid) utilized to improve and/or restore self-care/home management as related to lower body dressing. Required minimal visual and verbal cueing to facilitate activities of daily living skills, compensatory activities and use of adaptive equipment (reacher, sock aid, long handle shoe horn) . Braces/Orthotics/Lines/Etc:  
· IV 
· O2 Device: Room air Treatment/Session Assessment:   
· Response to Treatment:  Tolerated well · Interdisciplinary Collaboration:  
o Occupational Therapist 
o Registered Nurse · After treatment position/precautions:  
o Up in chair 
o Bed/Chair-wheels locked 
o Call light within reach 
o RN notified 
o Visitors at bedside · Compliance with Program/Exercises: Compliant all of the time, Will assess as treatment progresses. · Recommendations/Intent for next treatment session: \"Next visit will focus on advancements to more challenging activities and reduction in assistance provided\". Total Treatment Duration: OT Patient Time In/Time Out Time In: 4408 Time Out: 1022 Noreen Zimmerman OTR/L

## 2019-01-08 NOTE — PROGRESS NOTES
Orthopedic Joint Progress Note 2019 Admit Date: 2019 Admit Diagnosis: Primary osteoarthritis of right hip [M16.11] 1 Day Post-Op Subjective:  
 
Dami Odom alert, oriented, doing well with friends at bedside. Review of Systems: Pertinent items are noted in HPI. Objective:  
 
PT/OT:  
 
PATIENT MOBILITY Bed Mobility Rolling: Modified independent Supine to Sit: Modified independent Scooting: Modified independent Transfers Sit to Stand: Contact guard assistance Stand to Sit: Contact guard assistance Bed to Chair: Contact guard assistance Gait Speed/Leny: Slow Step Length: Left shortened, Right shortened Gait Abnormalities: Circumduction Ambulation - Level of Assistance: Contact guard assistance Distance (ft): 550 Feet (ft) Assistive Device: Walker, rolling Interventions: Safety awareness training Weight Bearing Status Right Side Weight Bearing: As tolerated Vital Signs:   
Blood pressure 122/74, pulse 64, temperature 98.6 °F (37 °C), resp. rate 18, height 6' 4\" (1.93 m), weight 89.9 kg (198 lb 3 oz), SpO2 96 %. Temp (24hrs), Av.2 °F (37.3 °C), Min:97.6 °F (36.4 °C), Max:101.9 °F (38.8 °C) Pain Control:  
Pain Assessment Pain Scale 1: Numeric (0 - 10) Pain Intensity 1: 0 Pain Onset 1: post op Pain Location 1: Hip Pain Orientation 1: Right Pain Description 1: Aching Pain Intervention(s) 1: Other (comment)(walking) Meds: 
Current Facility-Administered Medications Medication Dose Route Frequency  alcohol 62% (NOZIN) nasal  1 Ampule  1 Ampule Topical Q12H  
 0.9% sodium chloride infusion  100 mL/hr IntraVENous CONTINUOUS  
 sodium chloride (NS) flush 5-40 mL  5-40 mL IntraVENous Q8H  
 sodium chloride (NS) flush 5-40 mL  5-40 mL IntraVENous PRN  
 celecoxib (CELEBREX) capsule 200 mg  200 mg Oral Q12H  
 naloxone (NARCAN) injection 0.2-0.4 mg  0.2-0.4 mg IntraVENous Q10MIN PRN  
  dexamethasone (DECADRON) injection 10 mg  10 mg IntraVENous ONCE  
 diphenhydrAMINE (BENADRYL) capsule 25 mg  25 mg Oral Q4H PRN  
 aspirin delayed-release tablet 325 mg  325 mg Oral Q12H  
 tuberculin injection 5 Units  5 Units IntraDERMal ONCE  
 HYDROcodone-acetaminophen (NORCO)  mg tablet 1 Tab  1 Tab Oral Q4H PRN  
 HYDROmorphone (PF) (DILAUDID) injection 1 mg  1 mg IntraVENous Q3H PRN  
 senna-docusate (PERICOLACE) 8.6-50 mg per tablet 2 Tab  2 Tab Oral DAILY  enoxaparin (LOVENOX) injection 40 mg  40 mg SubCUTAneous Q24H  
  
 
LAB:   
Lab Results Component Value Date/Time INR 1.0 01/07/2019 07:16 AM  
 
Lab Results Component Value Date/Time HGB 12.1 (L) 01/08/2019 05:29 AM  
 HGB 13.8 01/02/2019 12:08 PM  
 
 
Wound Hip Right (Active) DRESSING STATUS Clean, dry, and intact 1/8/2019  8:34 AM  
DRESSING TYPE ABD pad;Special tape (comment) 1/8/2019  8:34 AM  
Number of days: 1 Physical Exam: No significant changes. NVI. Postop x-rays look good. Assessment:  
  
Principal Problem: 
  Degenerative joint disease (DJD) of hip (1/6/2019) Plan:  
 
Continue PT/OT/Rehab Consult: Rehab team including PT, OT, recreational therapy, and  Patient Expects to be Discharged to[de-identified] Private residence Signed By: Danika Dietz MD

## 2019-01-08 NOTE — PROGRESS NOTES
Problem: Falls - Risk of 
Goal: *Absence of Falls Document Bran Pinedo Fall Risk and appropriate interventions in the flowsheet. Outcome: Progressing Towards Goal 
Fall Risk Interventions: 
Mobility Interventions: Bed/chair exit alarm, OT consult for ADLs, Patient to call before getting OOB, PT Consult for mobility concerns, PT Consult for assist device competence, Strengthening exercises (ROM-active/passive), Utilize walker, cane, or other assistive device Medication Interventions: Bed/chair exit alarm, Assess postural VS orthostatic hypotension, Patient to call before getting OOB, Teach patient to arise slowly Elimination Interventions: Bed/chair exit alarm, Call light in reach, Patient to call for help with toileting needs, Toilet paper/wipes in reach

## 2019-01-08 NOTE — PROGRESS NOTES
Problem: Mobility Impaired (Adult and Pediatric) Goal: *Acute Goals and Plan of Care (Insert Text) Short Term Goals: 1.  Mr. Glen Nageotte will move from supine to sit and sit to supine in flat bed with INDEPENDENT within 3 day(s). 2.  Mr. Glen Nageotte will transfer from sit to stand and stand to sit with INDEPENDENT  within 3 days. 3.  Mr. Glen Nageotte will ambulate with MODIFIED INDEPENDENCE for 250 feet with the least restrictive device within 3 day(s). 4.  Mr. Glen Nageotte will verbalize 3/3 spinal precautions to ensure back safety during functional activities within 3 days. ________________________________________________________________________ PHYSICAL THERAPY: Daily Note, Treatment Day: 1st, AM 1/8/2019INPATIENT: Hospital Day: 2 Payor: BLUE CROSS / [place] BLUE ESSENTIALS BRANDI / Product Type: BRANDI /  
R LE hip precautions NAME/AGE/GENDER: Tra Granados is a 63 y.o. male PRIMARY DIAGNOSIS: Primary osteoarthritis of right hip [M16.11] Degenerative joint disease (DJD) of hip Degenerative joint disease (DJD) of hip Procedure(s) (LRB): 
RIGHT HIP ARTHROPLASTY TOTAL (Right) 1 Day Post-Op ICD-10: Treatment Diagnosis:  
 · Other abnormalities of gait and mobility (R26.89) Precaution/Allergies: 
Iodine ASSESSMENT:  
Mr. Glen Nageotte presents supine in bed and agreeable to therapy. He transitioned to sit modified independent  and cueing to maintain hip precautions. He stood with CGA and ambulated 550' with RW and CGA with better technique. Patient is returned to the recliner and participates in therapeutic exercises for RLE strengthening. Tolerated well. Patient is making excellent progress towards goals. Patient is very pleasant and cooperative. Patient has declined in functional mobility following undergoing above surgery. Mr. Glen Nageotte would benefit from skilled physical therapy (medically necessary) to address his deficits and maximize his function. Will continue PT efforts. This section established at most recent assessment PROBLEM LIST (Impairments causing functional limitations): 1. Decreased Strength 2. Decreased ADL/Functional Activities 3. Decreased Transfer Abilities 4. Decreased Ambulation Ability/Technique 5. Decreased Balance 6. Decreased Activity Tolerance 7. Decreased Knowledge of Precautions 8. Decreased [place] with Home Exercise Program 
 INTERVENTIONS PLANNED: (Benefits and precautions of physical therapy have been discussed with the patient.) 1. Balance Exercise 2. Bed Mobility 3. Home Exercise Program (HEP) 4. Therapeutic Activites 5. Therapeutic Exercise/Strengthening 6. Transfer Training 7. education TREATMENT PLAN: Frequency/Duration: twice daily for duration of hospital stay Rehabilitation Potential For Stated Goals: Excellent RECOMMENDED REHABILITATION/EQUIPMENT: (at time of discharge pending progress): Due to the probability of continued deficits (see above) this patient will likely need continued skilled physical therapy after discharge. Equipment:  
? None at this time HISTORY:  
History of Present Injury/Illness (Reason for Referral): Admitted for above surgery. Past Medical History/Comorbidities:  
Mr. Nilson Odonnell  has a past medical history of Arthritis, Asthma, and Chronic pain. Mr. Nilson Odonnell  has a past surgical history that includes hx tonsil and adenoidectomy and RIGHT HIP ARTHROPLASTY TOTAL (Right, 1/7/2019). Social History/Living Environment:  
Home Environment: Private residence # Steps to Enter: 1 One/Two Story Residence: One story Living Alone: Yes Support Systems: Friends \ neighbors Patient Expects to be Discharged to[de-identified] Private residence Current DME Used/Available at Home: Raised toilet seat Tub or Shower Type: Shower Prior Level of Function/Work/Activity: 
Lives at home alone. Independent in home and community.  
   
Number of Personal Factors/Comorbidities that affect the Plan of Care: 1-2: MODERATE COMPLEXITY EXAMINATION:  
Most Recent Physical Functioning:  
Gross Assessment: 
  
         
  
Posture: 
  
Balance: 
Sitting: Intact Standing: Impaired Standing - Static: Good Standing - Dynamic : Fair Bed Mobility: 
Rolling: Modified independent Supine to Sit: Modified independent Scooting: Modified independent Wheelchair Mobility: 
  
Transfers: 
Sit to Stand: Contact guard assistance Stand to Sit: Contact guard assistance Gait: 
Right Side Weight Bearing: As tolerated Distance (ft): 550 Feet (ft) Assistive Device: Walker, rolling Ambulation - Level of Assistance: Contact guard assistance Interventions: Safety awareness training Body Structures Involved: 1. Bones 2. Joints 3. Muscles 4. Ligaments Body Functions Affected: 1. Sensory/Pain 2. Movement Related 3. Skin Related Activities and Participation Affected: 1. Mobility 2. Self Care 3. Domestic Life 4. Community, Social and [place] Number of elements that affect the Plan of Care: 4+: HIGH COMPLEXITY CLINICAL PRESENTATION:  
Presentation: Evolving clinical presentation with changing clinical characteristics: MODERATE COMPLEXITY CLINICAL DECISION [facility] [place] [address] [address] AM-PAC 6 Clicks Basic Mobility Inpatient Short Form How much difficulty does the patient currently have. .. Unable A Lot A Little None 1. Turning over in bed (including adjusting bedclothes, sheets and blankets)? [] 1   [] 2   [x] 3   [] 4  
2. Sitting down on and standing up from a chair with arms ( e.g., wheelchair, bedside commode, etc.)   [] 1   [] 2   [x] 3   [] 4  
3. Moving from lying on back to sitting on the side of the bed? [] 1   [] 2   [x] 3   [] 4 How much help from another person does the patient currently need. .. Total A Lot A Little None 4. Moving to and from a bed to a chair (including a wheelchair)? [] 1   [] 2   [x] 3   [] 4  
5. Need to walk in hospital room?    [] 1   [] 2   [x] 3   [] 4  
 6.  Climbing 3-5 steps with a railing? [] 1   [] 2   [x] 3   [] 4  
© 2007, Trustees of [facility] [employer], under license to [employer]. All rights reserved Score:  Initial: 18 Most Recent: X (Date: -- ) Interpretation of Tool:  Represents activities that are increasingly more difficult (i.e. Bed mobility, Transfers, Gait). Score 24 23 22-20 19-15 14-10 9-7 6 Modifier CH CI CJ CK CL CM CN   
 
? Mobility - Walking and Moving Around:  
  - CURRENT STATUS: CK - 40%-59% impaired, limited or restricted  - GOAL STATUS: CJ - 20%-39% impaired, limited or restricted  - D/C STATUS:  ---------------To be determined--------------- Payor: BLUE CROSS / [place] BLUE ESSENTIALS BRANDI / Product Type: BRANDI /   
 
Medical Necessity:    
· Patient is expected to demonstrate progress in strength, range of motion, balance, coordination and functional technique to increase independence with   and improve safety during all functional mobility. Reason for Services/Other Comments: 
· Patient continues to require skilled intervention due to decline in functional mobility. Use of outcome tool(s) and clinical judgement create a POC that gives a: Clear prediction of patient's progress: LOW COMPLEXITY  
  
 
 
 
TREATMENT:  
(In addition to Assessment/Re-Assessment sessions the following treatments were rendered) Pre-treatment Symptoms/Complaints: \"Hello. I am ready\" Pain: Initial:  
Pain Intensity 1: 0  Post Session:  Unchanged. Therapeutic Activity: (    15 minutes): Therapeutic activities including R LE AROM, Ambulation on level ground and manueverinig RW in and out of restroom and education in hip precautions to improve mobility, strength, balance and coordination. Required moderate Safety awareness training to promote safe technique during mobility. DATE: 1/07/19 1/08/19 Ambulation Hip Flexion  30 [address]  30 Knee Squeezes Ankle DF/PF 2x10 AB 30 Hip AB/AD long sit x10 AAR 30 Key:  A=active, AA=active assisted, P=passive, B=bilaterally, R=right, L=left DF=dorsiflexion, PF=plantarflexion Therapeutic Exercise: ( 10 minutes):  Exercises per grid below to improve mobility, strength, balance and coordination. Required minimum  verbal cues to promote proper body alignment. Progressed repetitions and complexity of movement as indicated. 
 
y 
  
 
Braces/Orthotics/Lines/Etc:  
· IV 
· O2 Device: Room air Treatment/Session Assessment:   
· Response to Treatment:  Pleasant and cooperative. Tolerated well · Interdisciplinary Collaboration:  
o Physical Therapy Assistant 
o Registered Nurse · After treatment position/precautions:  
o Up in chair 
o Bed/Chair-wheels locked 
o Call light within reach 
o RN notified 
o Visitors at bedside · Compliance with Program/Exercises: Compliant all of the time · Recommendations/Intent for next treatment session: \"Next visit will focus on advancements to more challenging activities and reduction in assistance provided\". Total Treatment Duration: PT Patient Time In/Time Out Time In: 5587 Time Out: 6298 Janeen Yonug, PTA

## 2019-01-08 NOTE — ADVANCED PRACTICE NURSE
Post anesthesia rounds: 
Pt denies any anesthesia concerns. Pt stated his anesthesia experience was a \"great experience\". Pt denies any nausea or vomiting. Pain managed well. Efe Walter CRNA

## 2019-01-08 NOTE — PROGRESS NOTES
Problem: Interdisciplinary Rounds Goal: Interdisciplinary Rounds Outcome: Progressing Towards Goal 
Interdisciplinary team rounds were held 1/8/2019 with the following team members:Care Management, Physical Therapy and . Anticipate discharge home with home health tomorrow. Plan of care discussed. See clinical pathway and/or care plan for interventions and desired outcomes.

## 2019-01-08 NOTE — PROGRESS NOTES
Problem: Mobility Impaired (Adult and Pediatric) Goal: *Acute Goals and Plan of Care (Insert Text) Short Term Goals: 1.  Mr. Loreto Grigsby will move from supine to sit and sit to supine in flat bed with INDEPENDENT within 3 day(s). 2.  Mr. Loreto Grigsby will transfer from sit to stand and stand to sit with INDEPENDENT  within 3 days. 3.  Mr. Loreto Grigsby will ambulate with MODIFIED INDEPENDENCE for 250 feet with the least restrictive device within 3 day(s). 4.  Mr. Loreto Grigsby will verbalize 3/3 spinal precautions to ensure back safety during functional activities within 3 days. ________________________________________________________________________ PHYSICAL THERAPY: Daily Note, Treatment Day: 1st, PM 1/8/2019INPATIENT: Hospital Day: 2 Payor: BLUE CROSS / [place] BLUE ESSENTIALS BRANDI / Product Type: BRANDI /  
R LE hip precautions NAME/AGE/GENDER: Lisseth Samano is a 63 y.o. male PRIMARY DIAGNOSIS: Primary osteoarthritis of right hip [M16.11] Degenerative joint disease (DJD) of hip Degenerative joint disease (DJD) of hip Procedure(s) (LRB): 
RIGHT HIP ARTHROPLASTY TOTAL (Right) 1 Day Post-Op ICD-10: Treatment Diagnosis:  
 · Other abnormalities of gait and mobility (R26.89) Precaution/Allergies: 
Iodine ASSESSMENT:  
Mr. Loreto Grigsby presents supine in bed and agreeable to therapy. He transitioned to sit modified independent  and cueing to maintain hip precautions. He stood with CGA and ambulated 550' with RW and CGA with better technique. Patient is returned to the recliner and participates in therapeutic exercises for RLE strengthening. Tolerated well. Patient is making excellent progress towards goals. Patient is very pleasant and cooperative. Patient has declined in functional mobility following undergoing above surgery.   Mr. Loreto Grigsby would benefit from skilled physical therapy (medically necessary) to address his deficits and maximize his function. Will continue PT efforts. PM treatment:  Patient is supine in bed and agreeable to therapy. He wants to be sure that he is getting in and out of the bed safely. Logrolling reviewed and patient able to complete with contact guard assist.  Sitting balance good. Sit to stand with supervision. Gait training continued with increase in gait distance. Patient is returned to supine in bed with needs within reach. Good session. Patient is making good progress towards goals. Patient is very pleasant and cooperative. Will continue PT efforts. This section established at most recent assessment PROBLEM LIST (Impairments causing functional limitations): 1. Decreased Strength 2. Decreased ADL/Functional Activities 3. Decreased Transfer Abilities 4. Decreased Ambulation Ability/Technique 5. Decreased Balance 6. Decreased Activity Tolerance 7. Decreased Knowledge of Precautions 8. Decreased [place] with Home Exercise Program 
 INTERVENTIONS PLANNED: (Benefits and precautions of physical therapy have been discussed with the patient.) 1. Balance Exercise 2. Bed Mobility 3. Home Exercise Program (HEP) 4. Therapeutic Activites 5. Therapeutic Exercise/Strengthening 6. Transfer Training 7. education TREATMENT PLAN: Frequency/Duration: twice daily for duration of hospital stay Rehabilitation Potential For Stated Goals: Excellent RECOMMENDED REHABILITATION/EQUIPMENT: (at time of discharge pending progress): Due to the probability of continued deficits (see above) this patient will likely need continued skilled physical therapy after discharge. Equipment:  
? None at this time HISTORY:  
History of Present Injury/Illness (Reason for Referral): Admitted for above surgery. Past Medical History/Comorbidities: Mr. Brant Lindquist  has a past medical history of Arthritis, Asthma, and Chronic pain. Mr. Brant Lindquist  has a past surgical history that includes hx tonsil and adenoidectomy and RIGHT HIP ARTHROPLASTY TOTAL (Right, 1/7/2019). Social History/Living Environment:  
Home Environment: Private residence # Steps to Enter: 1 One/Two Story Residence: One story Living Alone: Yes Support Systems: Friends \ neighbors Patient Expects to be Discharged to[de-identified] Private residence Current DME Used/Available at Home: Raised toilet seat Tub or Shower Type: Shower Prior Level of Function/Work/Activity: 
Lives at home alone. Independent in home and community. Number of Personal Factors/Comorbidities that affect the Plan of Care: 1-2: MODERATE COMPLEXITY EXAMINATION:  
Most Recent Physical Functioning:  
Gross Assessment: 
  
         
  
Posture: 
  
Balance: 
Sitting: Intact Standing: Impaired Standing - Static: Good Standing - Dynamic : Fair Bed Mobility: 
Rolling: Modified independent Supine to Sit: Modified independent Sit to Supine: Modified independent Scooting: Modified independent Wheelchair Mobility: 
  
Transfers: 
Sit to Stand: Contact guard assistance Stand to Sit: Contact guard assistance Gait: 
Right Side Weight Bearing: As tolerated Distance (ft): 750 Feet (ft) Assistive Device: Walker, rolling Ambulation - Level of Assistance: Contact guard assistance Interventions: Safety awareness training Body Structures Involved: 1. Bones 2. Joints 3. Muscles 4. Ligaments Body Functions Affected: 1. Sensory/Pain 2. Movement Related 3. Skin Related Activities and Participation Affected: 1. Mobility 2. Self Care 3. Domestic Life 4. Community, Social and [place] Number of elements that affect the Plan of Care: 4+: HIGH COMPLEXITY CLINICAL PRESENTATION:  
Presentation: Evolving clinical presentation with changing clinical characteristics: MODERATE COMPLEXITY CLINICAL DECISION MAKING:  
 [facility] Basic Mobility Inpatient Short Form How much difficulty does the patient currently have. .. Unable A Lot A Little None 1. Turning over in bed (including adjusting bedclothes, sheets and blankets)? [] 1   [] 2   [x] 3   [] 4  
2. Sitting down on and standing up from a chair with arms ( e.g., wheelchair, bedside commode, etc.)   [] 1   [] 2   [x] 3   [] 4  
3. Moving from lying on back to sitting on the side of the bed? [] 1   [] 2   [x] 3   [] 4 How much help from another person does the patient currently need. .. Total A Lot A Little None 4. Moving to and from a bed to a chair (including a wheelchair)? [] 1   [] 2   [x] 3   [] 4  
5. Need to walk in hospital room? [] 1   [] 2   [x] 3   [] 4  
6. Climbing 3-5 steps with a railing? [] 1   [] 2   [x] 3   [] 4  
© 2007, Trustees of [facility] [employer], under license to [employer]. All rights reserved Score:  Initial: 18 Most Recent: X (Date: -- ) Interpretation of Tool:  Represents activities that are increasingly more difficult (i.e. Bed mobility, Transfers, Gait). Score 24 23 22-20 19-15 14-10 9-7 6 Modifier CH CI CJ CK CL CM CN   
 
? Mobility - Walking and Moving Around:  
  - CURRENT STATUS: CK - 40%-59% impaired, limited or restricted  - GOAL STATUS: CJ - 20%-39% impaired, limited or restricted  - D/C STATUS:  ---------------To be determined--------------- Payor: BLUE CROSS / [place] BLUE ESSENTIALS BRANDI / Product Type: BRANDI /   
 
Medical Necessity:    
· Patient is expected to demonstrate progress in strength, range of motion, balance, coordination and functional technique to increase independence with   and improve safety during all functional mobility. Reason for Services/Other Comments: 
· Patient continues to require skilled intervention due to decline in functional mobility.   
Use of outcome tool(s) and clinical judgement create a POC that gives a: Clear prediction of patient's progress: LOW COMPLEXITY  
  
 
 
 
TREATMENT:  
(In addition to Assessment/Re-Assessment sessions the following treatments were rendered) Pre-treatment Symptoms/Complaints:   \" I am ready\" Pain: Initial:  
Pain Intensity 1: 0  Post Session:  Unchanged. Therapeutic Activity: (    16 minutes): Therapeutic activities including R LE AROM, Ambulation on level ground and manueverinig RW in and out of restroom and education in hip precautions to improve mobility, strength, balance and coordination. Required contact guard assist with  Safety awareness training to promote safe technique during mobility. DATE: 1/07/19 1/08/19 Ambulation Hip Flexion  30 [address]  30 Knee Squeezes Ankle DF/PF 2x10 AB 30 Hip AB/AD long sit x10 AAR 30 Key:  A=active, AA=active assisted, P=passive, B=bilaterally, R=right, L=left DF=dorsiflexion, PF=plantarflexion Therapeutic Exercise: (  minutes):  Exercises per grid below to improve mobility, strength, balance and coordination. Required minimum  verbal cues to promote proper body alignment. Progressed repetitions and complexity of movement as indicated. 
 
y 
  
 
Braces/Orthotics/Lines/Etc:  
· IV 
· O2 Device: Room air Treatment/Session Assessment:   
· Response to Treatment:  Pleasant and cooperative. Tolerated well · Interdisciplinary Collaboration:  
o Physical Therapy Assistant 
o Registered Nurse · After treatment position/precautions:  
o Supine in bed 
o Bed/Chair-wheels locked 
o Bed in low position 
o Call light within reach 
o RN notified · Compliance with Program/Exercises: Compliant all of the time · Recommendations/Intent for next treatment session: \"Next visit will focus on advancements to more challenging activities and reduction in assistance provided\". Total Treatment Duration: PT Patient Time In/Time Out Time In: 9907 Time Out: 4622 Brendan Prather, PTA

## 2019-01-09 VITALS
HEIGHT: 76 IN | RESPIRATION RATE: 17 BRPM | OXYGEN SATURATION: 96 % | DIASTOLIC BLOOD PRESSURE: 87 MMHG | WEIGHT: 198.19 LBS | BODY MASS INDEX: 24.13 KG/M2 | SYSTOLIC BLOOD PRESSURE: 170 MMHG | HEART RATE: 83 BPM | TEMPERATURE: 98.2 F

## 2019-01-09 PROCEDURE — 97110 THERAPEUTIC EXERCISES: CPT

## 2019-01-09 PROCEDURE — 74011250636 HC RX REV CODE- 250/636: Performed by: ORTHOPAEDIC SURGERY

## 2019-01-09 PROCEDURE — 74011250637 HC RX REV CODE- 250/637: Performed by: ORTHOPAEDIC SURGERY

## 2019-01-09 PROCEDURE — 97530 THERAPEUTIC ACTIVITIES: CPT

## 2019-01-09 PROCEDURE — 97535 SELF CARE MNGMENT TRAINING: CPT

## 2019-01-09 RX ADMIN — Medication 5 ML: at 05:44

## 2019-01-09 RX ADMIN — Medication 1 AMPULE: at 08:31

## 2019-01-09 RX ADMIN — ENOXAPARIN SODIUM 40 MG: 40 INJECTION SUBCUTANEOUS at 08:31

## 2019-01-09 RX ADMIN — CELECOXIB 200 MG: 200 CAPSULE ORAL at 08:31

## 2019-01-09 RX ADMIN — ASPIRIN 325 MG: 325 TABLET, DELAYED RELEASE ORAL at 08:31

## 2019-01-09 NOTE — PROGRESS NOTES
Problem: Mobility Impaired (Adult and Pediatric) Goal: *Acute Goals and Plan of Care (Insert Text) Short Term Goals: 1.  Mr. Krishan Snell will move from supine to sit and sit to supine in flat bed with INDEPENDENT within 3 day(s). 2.  Mr. Krishan Snell will transfer from sit to stand and stand to sit with INDEPENDENT  within 3 days. goal met 1/09/19 3. Mr. Krishan Snell will ambulate with MODIFIED INDEPENDENCE for 250 feet with the least restrictive device within 3 day(s). Goal met 1/09/19 4. Mr. Krishan Snell will verbalize 3/3 spinal precautions to ensure back safety during functional activities within 3 days. Goal met 1/09/19 
________________________________________________________________________ PHYSICAL THERAPY: Daily Note, Treatment Day: 2nd, AM 1/9/2019INPATIENT: Hospital Day: 3 Payor: BLUE CROSS / [place] BLUE ESSENTIALS BRANDI / Product Type: BRANDI /  
R LE hip precautions NAME/AGE/GENDER: Aleida Barker is a 63 y.o. male PRIMARY DIAGNOSIS: Primary osteoarthritis of right hip [M16.11] Degenerative joint disease (DJD) of hip Degenerative joint disease (DJD) of hip Procedure(s) (LRB): 
RIGHT HIP ARTHROPLASTY TOTAL (Right) 2 Days Post-Op ICD-10: Treatment Diagnosis:  
 · Other abnormalities of gait and mobility (R26.89) Precaution/Allergies: 
Iodine ASSESSMENT:  
Mr. Krishan Snell presents supine in bed and agreeable to therapy. He transitioned to sit modified independent   maintaining hip precautions. He stood with stand by assist  and ambulated 2500' with RW with better technique. Patient is returned to the recliner and participates in therapeutic exercises for RLE strengthening. Tolerated well. Patient is making excellent progress towards goals and increased his gait distance significantly. Patient is very pleasant and cooperative. Mr. Krishan Snell would benefit from skilled physical therapy (medically necessary) to address his deficits and maximize his function. Will continue PT efforts. Possible discharge home today with HHPT. This section established at most recent assessment PROBLEM LIST (Impairments causing functional limitations): 1. Decreased Strength 2. Decreased ADL/Functional Activities 3. Decreased Transfer Abilities 4. Decreased Ambulation Ability/Technique 5. Decreased Balance 6. Decreased Activity Tolerance 7. Decreased Knowledge of Precautions 8. Decreased [place] with Home Exercise Program 
 INTERVENTIONS PLANNED: (Benefits and precautions of physical therapy have been discussed with the patient.) 1. Balance Exercise 2. Bed Mobility 3. Home Exercise Program (HEP) 4. Therapeutic Activites 5. Therapeutic Exercise/Strengthening 6. Transfer Training 7. education TREATMENT PLAN: Frequency/Duration: twice daily for duration of hospital stay Rehabilitation Potential For Stated Goals: Excellent RECOMMENDED REHABILITATION/EQUIPMENT: (at time of discharge pending progress): Due to the probability of continued deficits (see above) this patient will likely need continued skilled physical therapy after discharge. Equipment:  
? None at this time HISTORY:  
History of Present Injury/Illness (Reason for Referral): Admitted for above surgery. Past Medical History/Comorbidities:  
Mr. David Dos Santos  has a past medical history of Arthritis, Asthma, and Chronic pain. Mr. David Dos Santos  has a past surgical history that includes hx tonsil and adenoidectomy and RIGHT HIP ARTHROPLASTY TOTAL (Right, 1/7/2019). Social History/Living Environment:  
Home Environment: Private residence # Steps to Enter: 1 One/Two Story Residence: One story Living Alone: Yes Support Systems: Friends \ neighbors Patient Expects to be Discharged to[de-identified] Private residence Current DME Used/Available at Home: Raised toilet seat Tub or Shower Type: Shower Prior Level of Function/Work/Activity: 
Lives at home alone. Independent in home and community. Number of Personal Factors/Comorbidities that affect the Plan of Care: 1-2: MODERATE COMPLEXITY EXAMINATION:  
Most Recent Physical Functioning:  
Gross Assessment: 
  
         
  
Posture: 
  
Balance: 
Sitting: Intact Standing: Impaired Standing - Static: Good Standing - Dynamic : Fair Bed Mobility: 
Rolling: Independent Supine to Sit: Independent Sit to Supine: Independent Scooting: Independent Wheelchair Mobility: 
  
Transfers: 
Sit to Stand: Stand-by assistance Stand to Sit: Stand-by assistance Gait: 
Right Side Weight Bearing: As tolerated Speed/Leny: Slow Gait Abnormalities: Decreased step clearance Distance (ft): 2500 Feet (ft) Assistive Device: Walker, rolling Ambulation - Level of Assistance: Stand-by assistance Interventions: Safety awareness training Body Structures Involved: 1. Bones 2. Joints 3. Muscles 4. Ligaments Body Functions Affected: 1. Sensory/Pain 2. Movement Related 3. Skin Related Activities and Participation Affected: 1. Mobility 2. Self Care 3. Domestic Life 4. Community, Social and [place] Number of elements that affect the Plan of Care: 4+: HIGH COMPLEXITY CLINICAL PRESENTATION:  
Presentation: Evolving clinical presentation with changing clinical characteristics: MODERATE COMPLEXITY CLINICAL DECISION MAKIN[address] [place], [place] [address] 6 Clicks Basic Mobility Inpatient Short Form How much difficulty does the patient currently have. .. Unable A Lot A Little None 1. Turning over in bed (including adjusting bedclothes, sheets and blankets)? [] 1   [] 2   [x] 3   [] 4  
2. Sitting down on and standing up from a chair with arms ( e.g., wheelchair, bedside commode, etc.)   [] 1   [] 2   [x] 3   [] 4  
3. Moving from lying on back to sitting on the side of the bed? [] 1   [] 2   [x] 3   [] 4 How much help from another person does the patient currently need. .. Total A Lot A Little None 4. Moving to and from a bed to a chair (including a wheelchair)? [] 1   [] 2   [x] 3   [] 4  
5. Need to walk in hospital room? [] 1   [] 2   [x] 3   [] 4  
6. Climbing 3-5 steps with a railing? [] 1   [] 2   [x] 3   [] 4  
© 2007, Trustees of [address] [place] [address] [facility], under license to [employer]. All rights reserved Score:  Initial: 18 Most Recent: X (Date: -- ) Interpretation of Tool:  Represents activities that are increasingly more difficult (i.e. Bed mobility, Transfers, Gait). Score 24 23 22-20 19-15 14-10 9-7 6 Modifier CH CI CJ CK CL CM CN   
 
? Mobility - Walking and Moving Around:  
  - CURRENT STATUS: CK - 40%-59% impaired, limited or restricted  - GOAL STATUS: CJ - 20%-39% impaired, limited or restricted  - D/C STATUS:  ---------------To be determined--------------- Payor: BLUE CROSS / [place] BLUE ESSENTIALS BRANDI / Product Type: BRANDI /   
 
Medical Necessity:    
· Patient is expected to demonstrate progress in strength, range of motion, balance, coordination and functional technique to increase independence with   and improve safety during all functional mobility. Reason for Services/Other Comments: 
· Patient continues to require skilled intervention due to decline in functional mobility. Use of outcome tool(s) and clinical judgement create a POC that gives a: Clear prediction of patient's progress: LOW COMPLEXITY  
  
 
 
 
TREATMENT:  
(In addition to Assessment/Re-Assessment sessions the following treatments were rendered) Pre-treatment Symptoms/Complaints:   \"Good morning I have been waiting for you\" Pain: Initial:  
Pain Intensity 1: 0  Post Session:  Unchanged. Therapeutic Activity: (    14 minutes):   Therapeutic activities including bed mobility, transfers and gait training on level ground with  education on hip precautions to improve mobility, strength, balance and coordination. Required stand by assist with  Safety awareness training to promote safe technique during mobility. DATE: 1/07/19 1/08/19 1/09/19 Ambulation Hip Flexion  30 30 [address]  30 30 Knee Squeezes Ankle DF/PF 2x10 AB 30 30 Hip AB/AD long sit x10 AAR 30 30 Quad sets   30 Key:  A=active, AA=active assisted, P=passive, B=bilaterally, R=right, L=left DF=dorsiflexion, PF=plantarflexion Therapeutic Exercise: ( 10 minutes):  Exercises per grid below to improve mobility, strength, balance and coordination. Required minimum  verbal cues to promote proper body alignment. Progressed repetitions and complexity of movement as indicated. 
 
y 
  
 
Braces/Orthotics/Lines/Etc:  
· IV 
· O2 Device: Room air Treatment/Session Assessment:   
· Response to Treatment:  Pleasant and cooperative. Tolerated well · Interdisciplinary Collaboration:  
o Physical Therapy Assistant 
o Registered Nurse · After treatment position/precautions:  
o Up in chair 
o Bed/Chair-wheels locked 
o Call light within reach 
o RN notified · Compliance with Program/Exercises: Compliant all of the time · Recommendations/Intent for next treatment session: \"Next visit will focus on advancements to more challenging activities and reduction in assistance provided\". Total Treatment Duration: PT Patient Time In/Time Out Time In: 9901 Time Out: 8529 Meryle Jews, PTA

## 2019-01-09 NOTE — PROGRESS NOTES
Problem: Falls - Risk of 
Goal: *Absence of Falls Document Suzie Conley Fall Risk and appropriate interventions in the flowsheet. Outcome: Progressing Towards Goal 
Fall Risk Interventions: 
Mobility Interventions: Bed/chair exit alarm, Patient to call before getting OOB, PT Consult for mobility concerns, PT Consult for assist device competence Medication Interventions: Bed/chair exit alarm, Patient to call before getting OOB, Teach patient to arise slowly Elimination Interventions: Bed/chair exit alarm, Call light in reach, Patient to call for help with toileting needs, Toilet paper/wipes in reach, Toileting schedule/hourly rounds

## 2019-01-09 NOTE — DISCHARGE SUMMARY
Total Joint Discharge Summary    Patient: Sangeetha Greenwood MRN: [id number]  SSN: [id number]    YOB: 1960  Age: 63 y.o. Sex: male      Admit Date: 1/7/2019  Discharge Date:1/9/2019  Admitting Physician: Keisha Celaya MD   Discharge Physician: Keisha Celaya MD   Admission Diagnoses: Primary osteoarthritis of right hip [M16.11]   Discharge Diagnoses:   Patient Active Problem List   Diagnosis Code    Degenerative joint disease (DJD) of hip M16.9     Surgeon: Surgeon(s):  MD Barbara Shepherd MD   DVT Prophylaxis:  Lovenox, ASA                                  FIDEL Hose  Postoperative Complications: none detected  Last Hemoglobin:   Lab Results   Component Value Date/Time    HGB 12.1 (L) 01/08/2019 05:29 AM        Wound appears to be healing without any evidence of infection. Physical Therapy started on the day following surgery and progressed to independent ambulation with the aid of a walker. At the time of discharge, patient is able to go up and down stairs and has understanding of precautions needed following surgery. Discharged Disposition: Home    Discharge Instructions:   Anticoagulate with Ecotrin 325 mg orally once a day for 4 weeks   Resume pre-hospital diet             Resume home medications per medical continuation form      Ambulate with walker, appropriate total joint protocol   Follow up in office as scheduled    Call doctor immediately if temperature is greater [id number]°F, increased pain, swelling, drainage.    If shortness of breath or chest pain, immediately go to the Emergency Department    Signed By: Keisha Celaya MD     January 9, 2019

## 2019-01-09 NOTE — PROGRESS NOTES
Problem: Self Care Deficits Care Plan (Adult) Goal: *Acute Goals and Plan of Care (Insert Text) 1. Patient will verbalize and demonstrate understanding of hip precautions with 100% accuracy during ADL. GOAL MET 1/9/2019 2. Patient will complete functional transfers with modified independence and adaptive equipment as needed. 3. Patient will complete lower body bathing and dressing with setup and adaptive equipment as needed. GOAL MET 1/9/2019 4. Patient will complete toileting with supervision. 5. Patient will tolerate at least 20 minutes of BUE therapeutic exercises to increase strength in BUE to aid in functional transfers. 6. Patient will tolerate at least 20 minutes of OT treatment with no rest breaks to increase activity tolerance for ADLs. GOAL MET 1/9/2019 Timeframe: 7 visits OCCUPATIONAL THERAPY: Daily Note, Treatment Day: 2nd and AM 1/9/2019INPATIENT: Hospital Day: 3 Payor: Lillie Erazo / [place] BLUE ESSENTIALS BRANDI / Product Type: BRANDI /  
  
NAME/AGE/GENDER: Dale Child is a 63 y.o. male PRIMARY DIAGNOSIS:  Primary osteoarthritis of right hip [M16.11] Degenerative joint disease (DJD) of hip Degenerative joint disease (DJD) of hip Procedure(s) (LRB): 
RIGHT HIP ARTHROPLASTY TOTAL (Right) 2 Days Post-Op ICD-10: Treatment Diagnosis:  
 · Pain in Right Hip (M25.551) · Stiffness of Right Hip, Not elsewhere classified (M25.651) Precautions/Allergies: WBAT RLE Hip precautions Iodine ASSESSMENT:  
Mr. Mine Rivera is a 63year old male who is now s/p R KATHIE and is WBAT in RLE. At baseline patient lives alone and is typically independent with ADLs, although he admits to having trouble with LB dressing recently due to hip pain. Pt completed sponge bath and dressing with the assistance listed below in grid below. Pt had met 3 goals. Continue POC. This section established at most recent assessment PROBLEM LIST (Impairments causing functional limitations): 
 1. Decreased ADL/Functional Activities 2. Decreased Transfer Abilities 3. Decreased Ambulation Ability/Technique 4. Decreased Balance 5. Increased Pain 6. Decreased Activity Tolerance 7. Decreased Flexibility/Joint Mobility 8. Decreased Knowledge of Precautions INTERVENTIONS PLANNED: (Benefits and precautions of occupational therapy have been discussed with the patient.) 1. Activities of daily living training 2. Adaptive equipment training 3. Group therapy 4. Therapeutic activity 5. Therapeutic exercise TREATMENT PLAN: Frequency/Duration: Follow patient 6x/ week to address above goals. Rehabilitation Potential For Stated Goals: Good RECOMMENDED REHABILITATION/EQUIPMENT: (at time of discharge pending progress): Due to the probability of continued deficits (see above) this patient will likely need continued skilled occupational therapy after discharge. Equipment: ? TBD  
    
 
 
 
OCCUPATIONAL PROFILE AND HISTORY:  
History of Present Injury/Illness (Reason for Referral): 
R KATHIE Past Medical History/Comorbidities:  
Mr. Nilson Odonnell  has a past medical history of Arthritis, Asthma, and Chronic pain. Mr. Nilson Odonnell  has a past surgical history that includes hx tonsil and adenoidectomy and RIGHT HIP ARTHROPLASTY TOTAL (Right, 1/7/2019). Social History/Living Environment:  
Home Environment: Private residence # Steps to Enter: 1 One/Two Story Residence: One story Living Alone: Yes Support Systems: Friends \ neighbors Patient Expects to be Discharged to[de-identified] Private residence Current DME Used/Available at Home: Raised toilet seat Tub or Shower Type: Shower Prior Level of Function/Work/Activity: At baseline patient lives alone and is typically independent with ADLs, although he admits to having trouble with LB dressing recently due to hip pain. He drives and works in sales. Number of Personal Factors/Comorbidities that affect the Plan of Care: Brief history (0):  LOW COMPLEXITY ASSESSMENT OF OCCUPATIONAL PERFORMANCE[de-identified]  
Activities of Daily Living:  
Basic ADLs (From Assessment) Complex ADLs (From Assessment) Feeding: Setup Oral Facial Hygiene/Grooming: Setup Bathing: Minimum assistance Upper Body Dressing: Setup Lower Body Dressing: Minimum assistance Toileting: Minimum assistance Instrumental ADL Meal Preparation: Moderate assistance Homemaking: Moderate assistance Medication Management: Independent Financial Management: Independent Grooming/Bathing/Dressing Activities of Daily Living Grooming Grooming Assistance: Independent Washing Face: Independent Brushing Teeth: Independent Brushing/Combing Hair: Independent Cognitive Retraining Safety/Judgement: Fall prevention Upper Body Bathing Bathing Assistance: Independent Position Performed: Standing Lower Body Bathing Bathing Assistance: Modified independent Perineal  : Independent Position Performed: Standing Lower Body : Modified independent Adaptive Equipment: Long handled sponge Upper Body Dressing Assistance Dressing Assistance: Independent Pullover Shirt: Independent Lower Body Dressing Assistance Dressing Assistance: Modified independent Underpants: Modified independent Pants With Button/Zipper: Modified independent Socks: Modified independent Shoes with Cloth Laces: Modified independent Cues: Jessee Mathews Adaptive Equipment Used: Reacher;Long handled shoe horn;Sock aid Bed/Mat Mobility Rolling: Independent Supine to Sit: Independent Sit to Supine: Independent Sit to Stand: Independent Scooting: Independent Most Recent Physical Functioning:  
Gross Assessment: 
  
         
  
Posture: 
  
Balance: 
Sitting: Intact Standing: Intact Standing - Static: Good Standing - Dynamic : Fair Bed Mobility: 
Rolling: Independent Supine to Sit: Independent Sit to Supine: Independent Scooting: Independent Wheelchair Mobility: 
  
Transfers: 
Sit to Stand: Independent Stand to Sit: Stand-by assistance Patient Vitals for the past 6 hrs: 
 BP BP Patient Position SpO2 Pulse 01/09/19 0714 150/84 At rest 96 % 77  
01/09/19 1111 170/87 Sitting 96 % 83 Mental Status Neurologic State: Alert Orientation Level: Oriented X4 Cognition: Appropriate decision making, Appropriate for age attention/concentration Perception: Appears intact Perseveration: No perseveration noted Safety/Judgement: Fall prevention Physical Skills Involved: 1. Range of Motion 2. Balance 3. Activity Tolerance 4. Pain (acute) Cognitive Skills Affected (resulting in the inability to perform in a timely and safe manner): 1. None Psychosocial Skills Affected: 1. Habits/Routines 2. Environmental Adaptation 3. Social Roles Number of elements that affect the Plan of Care: 5+:  HIGH COMPLEXITY CLINICAL DECISION MAKING:  
[facility] AM-[facility] 6 Clicks Daily Activity Inpatient Short Form How much help from another person does the patient currently need. .. Total A Lot A Little None 1. Putting on and taking off regular lower body clothing? [] 1   [x] 2   [] 3   [] 4  
2. Bathing (including washing, rinsing, drying)? [] 1   [x] 2   [] 3   [] 4  
3. Toileting, which includes using toilet, bedpan or urinal?   [] 1   [x] 2   [] 3   [] 4  
4. Putting on and taking off regular upper body clothing? [] 1   [] 2   [x] 3   [] 4  
5. Taking care of personal grooming such as brushing teeth? [] 1   [] 2   [x] 3   [] 4  
6. Eating meals? [] 1   [] 2   [x] 3   [] 4  
© 2007, Trustees of [facility], under license to [employer]. All rights reserved Score:  Initial: 15 Most Recent: X (Date: -- ) Interpretation of Tool:  Represents activities that are increasingly more difficult (i.e. Bed mobility, Transfers, Gait). Score 24 23 22-20 19-15 14-10 9-7 6 Modifier CH CI CJ CK CL CM CN   
 
? Self Care:  - CURRENT STATUS: CK - 40%-59% impaired, limited or restricted  - GOAL STATUS: CJ - 20%-39% impaired, limited or restricted  - D/C STATUS:  ---------------To be determined--------------- Payor: BLUE CROSS / [place] BLUE ESSENTIALS BRANDI / Product Type: BRANDI /   
 
Medical Necessity:    
· Patient demonstrates good rehab potential due to higher previous functional level. Reason for Services/Other Comments: 
· Patient continues to require present interventions due to patient's inability to care for self while maintaining hip precautions. Use of outcome tool(s) and clinical judgement create a POC that gives a: MODERATE COMPLEXITY  
 
 
 
TREATMENT:  
(In addition to Assessment/Re-Assessment sessions the following treatments were rendered) Pre-treatment Symptoms/Complaints:   
Pain: Initial:  
Pain Intensity 1: 0  Post Session:  None Self Care: (39 minutes): Procedure(s) (per grid) utilized to improve and/or restore self-care/home management as related to lower body dressing, bathing and grooming. Required minimal verbal cueing to facilitate activities of daily living skills, compensatory activities and use of adaptive equipment (reacher, sock aid, long handle shoe horn) . Braces/Orthotics/Lines/Etc:  
· IV 
· O2 Device: Room air Treatment/Session Assessment:   
· Response to Treatment:  Tolerated well · Interdisciplinary Collaboration:  
o Certified Occupational Therapy Assistant 
o Registered Nurse · After treatment position/precautions:  
o Up in chair 
o Bed/Chair-wheels locked 
o Call light within reach 
o RN notified · Compliance with Program/Exercises: Compliant all of the time, Will assess as treatment progresses. · Recommendations/Intent for next treatment session: \"Next visit will focus on advancements to more challenging activities and reduction in assistance provided\". Total Treatment Duration: OT Patient Time In/Time Out Time In: 2821 Time Out: 0747 Jessika Houston [address] [place] [address] [employer]

## 2019-01-09 NOTE — PROGRESS NOTES
Problem: Falls - Risk of 
Goal: *Absence of Falls Document Freda Juanita Fall Risk and appropriate interventions in the flowsheet. Outcome: Progressing Towards Goal 
Fall Risk Interventions: 
Mobility Interventions: Bed/chair exit alarm, OT consult for ADLs, Patient to call before getting OOB, PT Consult for mobility concerns, PT Consult for assist device competence, Strengthening exercises (ROM-active/passive), Utilize walker, cane, or other assistive device Medication Interventions: Assess postural VS orthostatic hypotension, Bed/chair exit alarm, Patient to call before getting OOB, Teach patient to arise slowly Elimination Interventions: Bed/chair exit alarm, Call light in reach, Patient to call for help with toileting needs, Toilet paper/wipes in reach

## 2019-01-09 NOTE — DISCHARGE INSTRUCTIONS
Dislocation precautions. Please take a full strength coated aspirin q day. Call [phone] with questions or problems. Wound precautions. Maintain clean and dry dressing. Discharge Instructions:  · Anticoagulate with Ecotrin 325 mg orally once a day for 4 weeks  · Resume pre-hospital diet            · Resume home medications per medical continuation form     · Ambulate with walker, appropriate total joint protocol  · Follow up in office as scheduled   · Call doctor immediately if temperature is greater [id number]°F, increased pain, swelling, drainage. · If shortness of breath or chest pain, immediately go to the Emergency Department      DISCHARGE SUMMARY from Nurse    PATIENT INSTRUCTIONS:    After general anesthesia or intravenous sedation, for 24 hours or while taking prescription Narcotics:  · Limit your activities  · Do not drive and operate hazardous machinery  · Do not make important personal or business decisions  · Do  not drink alcoholic beverages  · If you have not urinated within 8 hours after discharge, please contact your surgeon on call. Report the following to your surgeon:  · Excessive pain, swelling, redness or odor of or around the surgical area  · Temperature over 100.5  · Nausea and vomiting lasting longer than 4 hours or if unable to take medications  · Any signs of decreased circulation or nerve impairment to extremity: change in color, persistent  numbness, tingling, coldness or increase pain  · Any questions    What to do at Home:  Recommended activity: Per MD and PT instructions    If you experience any of the following symptoms fever > 100.5, nausea, vomiting pain, chest pain and/or shortness of breath to ER please follow up with MD.    *  Please give a list of your current medications to your Primary Care Provider. *  Please update this list whenever your medications are discontinued, doses are      changed, or new medications (including over-the-counter products) are added.     * Please carry medication information at all times in case of emergency situations. These are general instructions for a healthy lifestyle:    No smoking/ No tobacco products/ Avoid exposure to second hand smoke  Surgeon General's Warning:  Quitting smoking now greatly reduces serious risk to your health. Obesity, smoking, and sedentary lifestyle greatly increases your risk for illness    A healthy diet, regular physical exercise & weight monitoring are important for maintaining a healthy lifestyle    You may be retaining fluid if you have a history of heart failure or if you experience any of the following symptoms:  Weight gain of 3 pounds or more overnight or 5 pounds in a week, increased swelling in our hands or feet or shortness of breath while lying flat in bed. Please call your doctor as soon as you notice any of these symptoms; do not wait until your next office visit. Recognize signs and symptoms of STROKE:    F-face looks uneven    A-arms unable to move or move unevenly    S-speech slurred or non-existent    T-time-call 911 as soon as signs and symptoms begin-DO NOT go       Back to bed or wait to see if you get better-TIME IS BRAIN. Warning Signs of HEART ATTACK     Call 911 if you have these symptoms:   Chest discomfort. Most heart attacks involve discomfort in the center of the chest that lasts more than a few minutes, or that goes away and comes back. It can feel like uncomfortable pressure, squeezing, fullness, or pain.  Discomfort in other areas of the upper body. Symptoms can include pain or discomfort in one or both arms, the back, neck, jaw, or stomach.  Shortness of breath with or without chest discomfort.  Other signs may include breaking out in a cold sweat, nausea, or lightheadedness. Don't wait more than five minutes to call 911 - MINUTES MATTER! Fast action can save your life. Calling 911 is almost always the fastest way to get lifesaving treatment.  Emergency Medical Services staff can begin treatment when they arrive -- up to an hour sooner than if someone gets to the hospital by car. The discharge information has been reviewed with the patient. The patient verbalized understanding. Discharge medications reviewed with the patient and appropriate educational materials and side effects teaching were provided.   ___________________________________________________________________________________________________________________________________

## 2019-01-09 NOTE — PROGRESS NOTES
Pt's D/C instructions completed. Verbalized understanding of all instructions including diet, activity, s/sx to alert MD, medications, wound care, and f/u appointment.

## 2019-03-13 ENCOUNTER — HOSPITAL ENCOUNTER (OUTPATIENT)
Dept: SURGERY | Age: 59
Discharge: HOME | End: 2019-03-13
Payer: COMMERCIAL

## 2019-03-13 VITALS
WEIGHT: 204.06 LBS | HEIGHT: 77 IN | SYSTOLIC BLOOD PRESSURE: 141 MMHG | HEART RATE: 69 BPM | RESPIRATION RATE: 16 BRPM | OXYGEN SATURATION: 95 % | BODY MASS INDEX: 24.09 KG/M2 | TEMPERATURE: 98.1 F | DIASTOLIC BLOOD PRESSURE: 76 MMHG

## 2019-03-13 LAB
ANION GAP SERPL CALC-SCNC: 5 MMOL/L (ref 7–16)
BACTERIA SPEC CULT: NORMAL
BUN SERPL-MCNC: 13 MG/DL (ref 6–23)
CALCIUM SERPL-MCNC: 8.7 MG/DL (ref 8.3–10.4)
CHLORIDE SERPL-SCNC: 107 MMOL/L (ref 98–107)
CO2 SERPL-SCNC: 30 MMOL/L (ref 21–32)
CREAT SERPL-MCNC: 1.07 MG/DL (ref 0.8–1.5)
ERYTHROCYTE [DISTWIDTH] IN BLOOD BY AUTOMATED COUNT: 12.3 % (ref 11.9–14.6)
GLUCOSE SERPL-MCNC: 99 MG/DL (ref 65–100)
HCT VFR BLD AUTO: 42.2 % (ref 41.1–50.3)
HGB BLD-MCNC: 13.7 G/DL (ref 13.6–17.2)
MCH RBC QN AUTO: 30.1 PG (ref 26.1–32.9)
MCHC RBC AUTO-ENTMCNC: 32.5 G/DL (ref 31.4–35)
MCV RBC AUTO: 92.7 FL (ref 79.6–97.8)
NRBC # BLD: 0 K/UL (ref 0–0.2)
PLATELET # BLD AUTO: 242 K/UL (ref 150–450)
PMV BLD AUTO: 8.9 FL (ref 9.4–12.3)
POTASSIUM SERPL-SCNC: 3.8 MMOL/L (ref 3.5–5.1)
RBC # BLD AUTO: 4.55 M/UL (ref 4.23–5.6)
SERVICE CMNT-IMP: NORMAL
SODIUM SERPL-SCNC: 142 MMOL/L (ref 136–145)
WBC # BLD AUTO: 6.6 K/UL (ref 4.3–11.1)

## 2019-03-13 PROCEDURE — 87641 MR-STAPH DNA AMP PROBE: CPT

## 2019-03-13 PROCEDURE — 80048 BASIC METABOLIC PNL TOTAL CA: CPT

## 2019-03-13 PROCEDURE — 85027 COMPLETE CBC AUTOMATED: CPT

## 2019-03-13 RX ORDER — ASPIRIN 325 MG
325 TABLET ORAL DAILY
COMMUNITY

## 2019-03-13 RX ORDER — ASCORBIC ACID 500 MG
500 TABLET ORAL
COMMUNITY

## 2019-03-13 NOTE — PERIOP NOTES
Lab results reviewed and acceptable per anesthesia protocol. Still awaiting MRSA results.     Recent Results (from the past 12 hour(s))   CBC W/O DIFF    Collection Time: 03/13/19  3:01 PM   Result Value Ref Range    WBC 6.6 4.3 - 11.1 K/uL    RBC 4.55 4.23 - 5.6 M/uL    HGB 13.7 13.6 - 17.2 g/dL    HCT 42.2 41.1 - 50.3 %    MCV 92.7 79.6 - 97.8 FL    MCH 30.1 26.1 - 32.9 PG    MCHC 32.5 31.4 - 35.0 g/dL    RDW 12.3 11.9 - 14.6 %    PLATELET 918 204 - 486 K/uL    MPV 8.9 (L) 9.4 - 12.3 FL    ABSOLUTE NRBC 0.00 0.0 - 0.2 K/uL   METABOLIC PANEL, BASIC    Collection Time: 03/13/19  3:01 PM   Result Value Ref Range    Sodium 142 136 - 145 mmol/L    Potassium 3.8 3.5 - 5.1 mmol/L    Chloride 107 98 - 107 mmol/L    CO2 30 21 - 32 mmol/L    Anion gap 5 (L) 7 - 16 mmol/L    Glucose 99 65 - 100 mg/dL    BUN 13 6 - 23 MG/DL    Creatinine 1.07 0.8 - 1.5 MG/DL    GFR est AA >60 >60 ml/min/1.73m2    GFR est non-AA >60 >60 ml/min/1.73m2    Calcium 8.7 8.3 - 10.4 MG/DL

## 2019-03-13 NOTE — PERIOP NOTES
Patient verified name and . Patient provided medical/health information and PTA medications to the best of their ability. TYPE  CASE:lll  Order for consent NOT found in EHR and unable to match case posting. Labs per surgeon:none, no orders in [facility] at time of PAT. Labs per anesthesia protocol: CBC, BMP, MRSA, T&S on DOS. EKG  :  2019 NSR. Patient denies any cardiac hx    Patient provided with and instructed on education handouts including Guide to Surgery, blood transfusions, pain management, and hand hygiene for the family and community, and [facility] brochure. Antibacterial soap and Hibiclens instructions given per hospital policy. Instructed patient to continue previous medications as prescribed prior to surgery unless otherwise directed and to take the following medications the day of surgery according to anesthesia guidelines : None . Instructed patient to hold  the following medications: Vitamins and  mg. Original medication prescription bottles were not visualized during patient appointment. Patient teach back successful and patient demonstrates knowledge of instruction.

## 2019-03-18 ENCOUNTER — ANESTHESIA (OUTPATIENT)
Dept: SURGERY | Age: 59
DRG: 470 | End: 2019-03-18
Payer: COMMERCIAL

## 2019-03-18 ENCOUNTER — HOSPITAL ENCOUNTER (INPATIENT)
Age: 59
LOS: 2 days | Discharge: HOME | DRG: 470 | End: 2019-03-20
Attending: ORTHOPAEDIC SURGERY | Admitting: ORTHOPAEDIC SURGERY
Payer: COMMERCIAL

## 2019-03-18 ENCOUNTER — APPOINTMENT (OUTPATIENT)
Dept: GENERAL RADIOLOGY | Age: 59
DRG: 470 | End: 2019-03-18
Attending: ORTHOPAEDIC SURGERY
Payer: COMMERCIAL

## 2019-03-18 ENCOUNTER — ANESTHESIA EVENT (OUTPATIENT)
Dept: SURGERY | Age: 59
DRG: 470 | End: 2019-03-18
Payer: COMMERCIAL

## 2019-03-18 DIAGNOSIS — M15.9 PRIMARY OSTEOARTHRITIS INVOLVING MULTIPLE JOINTS: Primary | ICD-10-CM

## 2019-03-18 LAB
ABO + RH BLD: NORMAL
BLOOD GROUP ANTIBODIES SERPL: NORMAL
SPECIMEN EXP DATE BLD: NORMAL

## 2019-03-18 PROCEDURE — 74011250636 HC RX REV CODE- 250/636

## 2019-03-18 PROCEDURE — 74011000250 HC RX REV CODE- 250

## 2019-03-18 PROCEDURE — 77030007880 HC KT SPN EPDRL BBMI -B: Performed by: ANESTHESIOLOGY

## 2019-03-18 PROCEDURE — C1776 JOINT DEVICE (IMPLANTABLE): HCPCS | Performed by: ORTHOPAEDIC SURGERY

## 2019-03-18 PROCEDURE — 77030020263 HC SOL INJ SOD CL0.9% LFCR 1000ML

## 2019-03-18 PROCEDURE — 77030020782 HC GWN BAIR PAWS FLX 3M -B: Performed by: ANESTHESIOLOGY

## 2019-03-18 PROCEDURE — 76010000171 HC OR TIME 2 TO 2.5 HR INTENSV-TIER 1: Performed by: ORTHOPAEDIC SURGERY

## 2019-03-18 PROCEDURE — 76060000035 HC ANESTHESIA 2 TO 2.5 HR: Performed by: ORTHOPAEDIC SURGERY

## 2019-03-18 PROCEDURE — 77030018836 HC SOL IRR NACL ICUM -A: Performed by: ORTHOPAEDIC SURGERY

## 2019-03-18 PROCEDURE — 77030029883 HC RETRV SUT ARTHSCP HOFFE BEAT -B: Performed by: ORTHOPAEDIC SURGERY

## 2019-03-18 PROCEDURE — 77030027138 HC INCENT SPIROMETER -A

## 2019-03-18 PROCEDURE — 74011250637 HC RX REV CODE- 250/637: Performed by: ANESTHESIOLOGY

## 2019-03-18 PROCEDURE — 74011000250 HC RX REV CODE- 250: Performed by: ORTHOPAEDIC SURGERY

## 2019-03-18 PROCEDURE — 74011000258 HC RX REV CODE- 258: Performed by: ORTHOPAEDIC SURGERY

## 2019-03-18 PROCEDURE — 77030013708 HC HNDPC SUC IRR PULS STRY –B: Performed by: ORTHOPAEDIC SURGERY

## 2019-03-18 PROCEDURE — 0SCB0ZZ EXTIRPATION OF MATTER FROM LEFT HIP JOINT, OPEN APPROACH: ICD-10-PCS | Performed by: ORTHOPAEDIC SURGERY

## 2019-03-18 PROCEDURE — 74011250636 HC RX REV CODE- 250/636: Performed by: ANESTHESIOLOGY

## 2019-03-18 PROCEDURE — 0SRB0JZ REPLACEMENT OF LEFT HIP JOINT WITH SYNTHETIC SUBSTITUTE, OPEN APPROACH: ICD-10-PCS | Performed by: ORTHOPAEDIC SURGERY

## 2019-03-18 PROCEDURE — 97161 PT EVAL LOW COMPLEX 20 MIN: CPT

## 2019-03-18 PROCEDURE — 65270000029 HC RM PRIVATE

## 2019-03-18 PROCEDURE — 77030031139 HC SUT VCRL2 J&J -A: Performed by: ORTHOPAEDIC SURGERY

## 2019-03-18 PROCEDURE — 74011250636 HC RX REV CODE- 250/636: Performed by: ORTHOPAEDIC SURGERY

## 2019-03-18 PROCEDURE — 77030006835 HC BLD SAW SAG STRY -B: Performed by: ORTHOPAEDIC SURGERY

## 2019-03-18 PROCEDURE — 76210000006 HC OR PH I REC 0.5 TO 1 HR: Performed by: ORTHOPAEDIC SURGERY

## 2019-03-18 PROCEDURE — 77030008467 HC STPLR SKN COVD -B: Performed by: ORTHOPAEDIC SURGERY

## 2019-03-18 PROCEDURE — 77030003665 HC NDL SPN BBMI -A: Performed by: ANESTHESIOLOGY

## 2019-03-18 PROCEDURE — 77030002922 HC SUT FBRWRE ARTH -B: Performed by: ORTHOPAEDIC SURGERY

## 2019-03-18 PROCEDURE — 74011250637 HC RX REV CODE- 250/637: Performed by: ORTHOPAEDIC SURGERY

## 2019-03-18 PROCEDURE — 77030019557 HC ELECTRD VES SEAL MEDT -F: Performed by: ORTHOPAEDIC SURGERY

## 2019-03-18 PROCEDURE — 97530 THERAPEUTIC ACTIVITIES: CPT

## 2019-03-18 PROCEDURE — 86900 BLOOD TYPING SEROLOGIC ABO: CPT

## 2019-03-18 PROCEDURE — 72170 X-RAY EXAM OF PELVIS: CPT

## 2019-03-18 DEVICE — POLARCUP XLPE INSERT 61/28 NON-CEMENTED
Type: IMPLANTABLE DEVICE | Site: HIP | Status: FUNCTIONAL
Brand: POLARCUP

## 2019-03-18 DEVICE — COBALT CHROME 12/14 TAPER FEMORAL                                    HEAD 28MM + 0: Type: IMPLANTABLE DEVICE | Site: HIP | Status: FUNCTIONAL

## 2019-03-18 DEVICE — POLARSTEM STANDARD NON-CEMENTED                                    WITH TI/HA 8
Type: IMPLANTABLE DEVICE | Site: HIP | Status: FUNCTIONAL
Brand: POLARSTEM

## 2019-03-18 DEVICE — POLARCUP SHELL TI-PLASMA 61NON-CEMENTED
Type: IMPLANTABLE DEVICE | Site: HIP | Status: FUNCTIONAL
Brand: POLARCUP

## 2019-03-18 RX ORDER — BUPIVACAINE HYDROCHLORIDE 7.5 MG/ML
INJECTION, SOLUTION INTRASPINAL
Status: COMPLETED | OUTPATIENT
Start: 2019-03-18 | End: 2019-03-18

## 2019-03-18 RX ORDER — SODIUM CHLORIDE, SODIUM LACTATE, POTASSIUM CHLORIDE, CALCIUM CHLORIDE 600; 310; 30; 20 MG/100ML; MG/100ML; MG/100ML; MG/100ML
100 INJECTION, SOLUTION INTRAVENOUS CONTINUOUS
Status: DISCONTINUED | OUTPATIENT
Start: 2019-03-18 | End: 2019-03-18 | Stop reason: HOSPADM

## 2019-03-18 RX ORDER — OXYCODONE HYDROCHLORIDE 5 MG/1
10 TABLET ORAL
Status: COMPLETED | OUTPATIENT
Start: 2019-03-18 | End: 2019-03-18

## 2019-03-18 RX ORDER — EPHEDRINE SULFATE 50 MG/ML
INJECTION, SOLUTION INTRAVENOUS AS NEEDED
Status: DISCONTINUED | OUTPATIENT
Start: 2019-03-18 | End: 2019-03-18 | Stop reason: HOSPADM

## 2019-03-18 RX ORDER — DEXAMETHASONE SODIUM PHOSPHATE 100 MG/10ML
10 INJECTION INTRAMUSCULAR; INTRAVENOUS ONCE
Status: COMPLETED | OUTPATIENT
Start: 2019-03-19 | End: 2019-03-19

## 2019-03-18 RX ORDER — FENTANYL CITRATE 50 UG/ML
INJECTION, SOLUTION INTRAMUSCULAR; INTRAVENOUS AS NEEDED
Status: DISCONTINUED | OUTPATIENT
Start: 2019-03-18 | End: 2019-03-18 | Stop reason: HOSPADM

## 2019-03-18 RX ORDER — HYDROMORPHONE HYDROCHLORIDE 2 MG/ML
0.5 INJECTION, SOLUTION INTRAMUSCULAR; INTRAVENOUS; SUBCUTANEOUS
Status: DISCONTINUED | OUTPATIENT
Start: 2019-03-18 | End: 2019-03-18 | Stop reason: HOSPADM

## 2019-03-18 RX ORDER — SODIUM CHLORIDE 9 MG/ML
100 INJECTION, SOLUTION INTRAVENOUS CONTINUOUS
Status: DISPENSED | OUTPATIENT
Start: 2019-03-18 | End: 2019-03-20

## 2019-03-18 RX ORDER — CEFAZOLIN SODIUM/WATER 2 G/20 ML
2 SYRINGE (ML) INTRAVENOUS ONCE
Status: COMPLETED | OUTPATIENT
Start: 2019-03-18 | End: 2019-03-18

## 2019-03-18 RX ORDER — LIDOCAINE HYDROCHLORIDE 10 MG/ML
0.3 INJECTION INFILTRATION; PERINEURAL ONCE
Status: DISCONTINUED | OUTPATIENT
Start: 2019-03-18 | End: 2019-03-18 | Stop reason: HOSPADM

## 2019-03-18 RX ORDER — HYDROCODONE BITARTRATE AND ACETAMINOPHEN 5; 325 MG/1; MG/1
1 TABLET ORAL
Status: DISCONTINUED | OUTPATIENT
Start: 2019-03-18 | End: 2019-03-20 | Stop reason: HOSPADM

## 2019-03-18 RX ORDER — CEFAZOLIN SODIUM/WATER 2 G/20 ML
2 SYRINGE (ML) INTRAVENOUS EVERY 8 HOURS
Status: COMPLETED | OUTPATIENT
Start: 2019-03-18 | End: 2019-03-19

## 2019-03-18 RX ORDER — ACETAMINOPHEN 500 MG
1000 TABLET ORAL ONCE
Status: DISCONTINUED | OUTPATIENT
Start: 2019-03-18 | End: 2019-03-18 | Stop reason: HOSPADM

## 2019-03-18 RX ORDER — SODIUM CHLORIDE 0.9 % (FLUSH) 0.9 %
5-40 SYRINGE (ML) INJECTION EVERY 8 HOURS
Status: DISCONTINUED | OUTPATIENT
Start: 2019-03-18 | End: 2019-03-20 | Stop reason: HOSPADM

## 2019-03-18 RX ORDER — MIDAZOLAM HYDROCHLORIDE 1 MG/ML
2 INJECTION, SOLUTION INTRAMUSCULAR; INTRAVENOUS
Status: COMPLETED | OUTPATIENT
Start: 2019-03-18 | End: 2019-03-18

## 2019-03-18 RX ORDER — DIPHENHYDRAMINE HCL 25 MG
25 CAPSULE ORAL
Status: DISCONTINUED | OUTPATIENT
Start: 2019-03-18 | End: 2019-03-20 | Stop reason: HOSPADM

## 2019-03-18 RX ORDER — AMOXICILLIN 250 MG
2 CAPSULE ORAL DAILY
Status: DISCONTINUED | OUTPATIENT
Start: 2019-03-19 | End: 2019-03-20 | Stop reason: HOSPADM

## 2019-03-18 RX ORDER — PROPOFOL 10 MG/ML
INJECTION, EMULSION INTRAVENOUS
Status: DISCONTINUED | OUTPATIENT
Start: 2019-03-18 | End: 2019-03-18 | Stop reason: HOSPADM

## 2019-03-18 RX ORDER — CELECOXIB 200 MG/1
200 CAPSULE ORAL EVERY 12 HOURS
Status: DISCONTINUED | OUTPATIENT
Start: 2019-03-18 | End: 2019-03-20 | Stop reason: HOSPADM

## 2019-03-18 RX ORDER — CELECOXIB 200 MG/1
200 CAPSULE ORAL ONCE
Status: COMPLETED | OUTPATIENT
Start: 2019-03-18 | End: 2019-03-18

## 2019-03-18 RX ORDER — PROPOFOL 10 MG/ML
INJECTION, EMULSION INTRAVENOUS AS NEEDED
Status: DISCONTINUED | OUTPATIENT
Start: 2019-03-18 | End: 2019-03-18 | Stop reason: HOSPADM

## 2019-03-18 RX ORDER — NALOXONE HYDROCHLORIDE 0.4 MG/ML
.2-.4 INJECTION, SOLUTION INTRAMUSCULAR; INTRAVENOUS; SUBCUTANEOUS
Status: DISCONTINUED | OUTPATIENT
Start: 2019-03-18 | End: 2019-03-20 | Stop reason: HOSPADM

## 2019-03-18 RX ORDER — SODIUM CHLORIDE 0.9 % (FLUSH) 0.9 %
5-40 SYRINGE (ML) INJECTION AS NEEDED
Status: DISCONTINUED | OUTPATIENT
Start: 2019-03-18 | End: 2019-03-20 | Stop reason: HOSPADM

## 2019-03-18 RX ORDER — ENOXAPARIN SODIUM 100 MG/ML
40 INJECTION SUBCUTANEOUS DAILY
Status: DISCONTINUED | OUTPATIENT
Start: 2019-03-19 | End: 2019-03-20 | Stop reason: HOSPADM

## 2019-03-18 RX ORDER — ASPIRIN 325 MG
325 TABLET, DELAYED RELEASE (ENTERIC COATED) ORAL EVERY 12 HOURS
Status: DISCONTINUED | OUTPATIENT
Start: 2019-03-18 | End: 2019-03-20 | Stop reason: HOSPADM

## 2019-03-18 RX ORDER — HYDROMORPHONE HYDROCHLORIDE 1 MG/ML
1 INJECTION, SOLUTION INTRAMUSCULAR; INTRAVENOUS; SUBCUTANEOUS
Status: DISCONTINUED | OUTPATIENT
Start: 2019-03-18 | End: 2019-03-20 | Stop reason: HOSPADM

## 2019-03-18 RX ADMIN — EPHEDRINE SULFATE 10 MG: 50 INJECTION, SOLUTION INTRAVENOUS at 11:34

## 2019-03-18 RX ADMIN — Medication 2 G: at 10:56

## 2019-03-18 RX ADMIN — ASPIRIN 325 MG: 325 TABLET, DELAYED RELEASE ORAL at 22:18

## 2019-03-18 RX ADMIN — Medication 1 AMPULE: at 15:54

## 2019-03-18 RX ADMIN — Medication 1 AMPULE: at 22:20

## 2019-03-18 RX ADMIN — EPHEDRINE SULFATE 10 MG: 50 INJECTION, SOLUTION INTRAVENOUS at 11:44

## 2019-03-18 RX ADMIN — MIDAZOLAM HYDROCHLORIDE 2 MG: 2 INJECTION, SOLUTION INTRAMUSCULAR; INTRAVENOUS at 10:23

## 2019-03-18 RX ADMIN — PROPOFOL 60 MG: 10 INJECTION, EMULSION INTRAVENOUS at 11:00

## 2019-03-18 RX ADMIN — FENTANYL CITRATE 25 MCG: 50 INJECTION, SOLUTION INTRAMUSCULAR; INTRAVENOUS at 12:26

## 2019-03-18 RX ADMIN — FENTANYL CITRATE 25 MCG: 50 INJECTION, SOLUTION INTRAMUSCULAR; INTRAVENOUS at 11:15

## 2019-03-18 RX ADMIN — BUPIVACAINE HYDROCHLORIDE 15 MG: 7.5 INJECTION, SOLUTION INTRASPINAL at 10:57

## 2019-03-18 RX ADMIN — OXYCODONE HYDROCHLORIDE 10 MG: 5 TABLET ORAL at 13:56

## 2019-03-18 RX ADMIN — TRANEXAMIC ACID 1 G: 1 INJECTION, SOLUTION INTRAVENOUS at 12:26

## 2019-03-18 RX ADMIN — SODIUM CHLORIDE 100 ML/HR: 900 INJECTION, SOLUTION INTRAVENOUS at 15:54

## 2019-03-18 RX ADMIN — Medication 5 ML: at 22:18

## 2019-03-18 RX ADMIN — FENTANYL CITRATE 25 MCG: 50 INJECTION, SOLUTION INTRAMUSCULAR; INTRAVENOUS at 11:25

## 2019-03-18 RX ADMIN — Medication 2 G: at 19:00

## 2019-03-18 RX ADMIN — SODIUM CHLORIDE, SODIUM LACTATE, POTASSIUM CHLORIDE, AND CALCIUM CHLORIDE: 600; 310; 30; 20 INJECTION, SOLUTION INTRAVENOUS at 12:14

## 2019-03-18 RX ADMIN — HYDROCODONE BITARTRATE AND ACETAMINOPHEN 1 TABLET: 5; 325 TABLET ORAL at 15:54

## 2019-03-18 RX ADMIN — PROPOFOL 100 MCG/KG/MIN: 10 INJECTION, EMULSION INTRAVENOUS at 11:00

## 2019-03-18 RX ADMIN — Medication 5 ML: at 15:00

## 2019-03-18 RX ADMIN — SODIUM CHLORIDE, SODIUM LACTATE, POTASSIUM CHLORIDE, AND CALCIUM CHLORIDE 100 ML/HR: 600; 310; 30; 20 INJECTION, SOLUTION INTRAVENOUS at 08:14

## 2019-03-18 RX ADMIN — TRANEXAMIC ACID 2 G: 1 INJECTION, SOLUTION INTRAVENOUS at 11:27

## 2019-03-18 RX ADMIN — CELECOXIB 200 MG: 200 CAPSULE ORAL at 09:55

## 2019-03-18 RX ADMIN — HYDROMORPHONE HYDROCHLORIDE 1 MG: 1 INJECTION, SOLUTION INTRAMUSCULAR; INTRAVENOUS; SUBCUTANEOUS at 17:47

## 2019-03-18 RX ADMIN — CELECOXIB 200 MG: 200 CAPSULE ORAL at 22:18

## 2019-03-18 RX ADMIN — HYDROMORPHONE HYDROCHLORIDE 1 MG: 1 INJECTION, SOLUTION INTRAMUSCULAR; INTRAVENOUS; SUBCUTANEOUS at 22:16

## 2019-03-18 NOTE — ANESTHESIA POSTPROCEDURE EVALUATION
Procedure(s):  HIP ARTHROPLASTY TOTAL/ LEFT/ GOMEZ AND NEPHEW.     Anesthesia Post Evaluation      Multimodal analgesia: multimodal analgesia used between 6 hours prior to anesthesia start to PACU discharge  Patient location during evaluation: PACU  Patient participation: complete - patient participated  Level of consciousness: awake and awake and alert  Pain management: adequate  Airway patency: patent  Anesthetic complications: no  Cardiovascular status: acceptable  Respiratory status: acceptable  Hydration status: acceptable  Post anesthesia nausea and vomiting:  controlled      Visit Vitals  /72   Pulse 65   Temp 36.3 °C (97.4 °F)   Resp 16   Ht 6' 5\" (1.956 m)   Wt 90.7 kg (200 lb)   SpO2 97%   BMI 23.72 kg/m²

## 2019-03-18 NOTE — H&P
Gideon Shetty  History and physical     Subjective  Problem List :.    1. Bilateral hip pain. DJD, s/p right KATHIE 1/2019    2. Right knee pain. This 63year old male presents today for evaluation of left hip pain/DJD. The patient comes in today for evaluation, history and physical, and surgical consent signing. The surgical procedure was reviewed in detail with the patient. The risks, including but not limited to anesthesia, infection, deep vein thrombosis, pulmonary embolus, injury to vessels, tendons and nerves, paralysis, stroke, heart attack, loss of limb, and death were discussed. The patient understands the post operative course and all questions were answered. No guarantees are made and all alternatives are given. The patient wishes to proceed with the surgery. Appropriate literature and relevant material was reviewed with the patient. Surgical procedure:  left total hip replacement        He is s/p right KATHIE 1/2019 and reports that he has no pain to the right hip and is able to tolerate walking as much as he desires. He reports mild right lower extremity swelling at the end of a long day. He also complaints of slight tenderness to the incision. He also has questions today about \"some\" numbness  to the tip of his little fingers. He reports this started with the use of a walker. He presents today ambulatory without assistance. Benna Him He is unaccompanied today. Major events: right KATHIE 1/2019. Ongoing medical problems: osteoarthritis left hip. Family health history: cancer-both parents and sister, heart disease-father, diabetes-father. . Preventive care: PCP: Dr. Princess Silver. . Social history: Patient denies tobacco use and EtOH use weekly. Patient is single. Employed at [employer]. Nutrition history: regular diet. Developmental history: none noted. General: Denies weight change, no change in strength or exercise tolerance. Head: Denies  headaches. Eyes: Denies visual changes or pain. Ears: Denies change in hearing. Nose: Denies epistaxis, no coryza, no obstruction, no discharge. Mouth: Denies dental difficulties. Neck: Denies stiffness, or pain. Breast: Denies lumps. Chest: Denies SOB or pain . Heart: Denies chest pains or palpitations. Abdomen: Denies abdominal pain, changes in bowels or blood in stools. : Denies changes in bladder function. Musculoskeletal: left hip pain     Skin: Denies rashes, itching, pigmentation changes,  hair changes, or nail changes. Neurologic: Denies weakness, no tremor, no seizures. Psychiatric: Denies depressive symptoms, no changes in sleep habits, no changes in thought content. Objective  Patient is a 63year old male who appears his given age and is in no apparent distress. Oriented to person, place, and time. Mood and affect are appropriate for age and situation. Assessment of respiratory effort reveals even and nonlabored respirations. .         Vital Signs: Height 77 inches; Weight 201 lbs; /80 mmHg; Temp 96.7 F; Pulse 62 bpm; Oxygen Saturation 98 %. .         Lungs clear to auscultation bilaterally. Heart rate regular without murmur heard to auscultation. AP Pelvis x-ray (CPT [id number]) taken previously reveal: severe bilateral joint space narrowing-severe. Left Hip Examination:. Inspection reveals no external signs of injury. .     Palpation reveals tenderness to groin area. Left hip has limited ROM. [facility] Left hip has 90 flexion. . Left hip has neutral external rotation. .     Left hip has 30 degrees internal rotation. No extension contractor. . Vascular: Peripheral pulses normal 2/2 lower extremities. . Neurologic: Sensation is intact and symmetrical in all dermatomes lower extremities. .     Deep tendon reflexes are normal.. No ankle clonus and negative Babinski's reflex. .     Coordination is good. .         Right Hip Examination:. Inspection reveals benign surgical incision with no s/s infection. .     No warmth or erythema noted. No palpable cords. Palpation reveals mild tenderness to the right hip incision    Hip ROM is within functional limits. Muscle tone is normal.. Vascular: Peripheral pulses normal 2/2 lower extremities. Neurologic: Sensation is intact and symmetrical in all dermatomes lower extremities. .     Deep tendon reflexes are normal. No ankle clonus and negative Babinski's reflex. Coordination is good. . No edema today. Mild ulnar distribution c/o tingling. Little fingers. Possibly from use of the walker post op. Assessment    DIAGNOSIS:        Primary osteoarthritis of left hip [ICD-10: M16.12], [ICD-9: 715.15], [SNOMED: 170003750]        Pre-op evaluation [ICD-10: B46.970], [SNOMED: 029166879]  Plan  We discussed post-op healing and expectations. KATHIE restrictions were discussed in detail and the patient verbalized understanding. His surgery is scheduled at [facility] 3/18/2019. All questions answered at this time. The patient knows to contact the office with any questions or concerns. VERIFICATION OF ANCILLARY DOCUMENTATION:  The portions of the chart completed by ancillary personnel were reviewed by the physician. RTC: post op        He plans to return home with outpatient PT at [facility] op.      Current medication:   Aspirin 81 MG Oral Tablet by mouth daily

## 2019-03-18 NOTE — OP NOTES
[facility]  OPERATIVE REPORT    Name:  Kristi Macias  MR#:  [id number]  :  1960  ACCOUNT #:  [de-identified]  DATE OF SERVICE:  2019      PREOPERATIVE DIAGNOSIS:  Severe left hip degenerative joint disease. POSTOPERATIVE DIAGNOSIS:  Severe left hip degenerative joint disease. PROCEDURE PERFORMED:  Left total hip arthroplasty with Tr[employer] Nephew press-fit components. SURGEON:  Sana Young MD    ASSISTANT SURGEONJustina Kim. Please note, because of the complexity of this patient's fracture and his large size, it was felt that a second assist was medically  necessary for the patient's benefit. ANESTHESIA:  Spinal anesthetic. COMPLICATIONS:  None. IMPLANTS:  See brief op note. ESTIMATED BLOOD LOSS:  300 mL. INDICATIONS:  The patient is a 80-year-old gentleman who approximately 3 months ago had a right total hip at this facility. He did extremely well. He has had continued pain and similar symptoms in his left hip and x-ray findings were identical with severe degenerative joint disease of that hip noted. He understands the risks and complications and wished to proceed. Informed consent was obtained. PROCEDURE IN DETAIL:  The patient was seen in the preoperative area. His chart was updated. His hip was marked. He was taken to operating room number 3. Successful spinal anesthetic was achieved. He was placed in the left lateral decubitus position on a gel liner over a pegboard with all prominences well padded. His hip was prepped and draped in a sterile field. He received 2 grams of Ancef perioperatively as well as 2 grams of tranexamic acid.   Once the hip was prepped and draped into a sterile field, a time-out was effected by the surgeon and was confirmed by the operative team.  We began utilizing a posterior approach to the hip with the incision centered on the lateral trochanter in the center portion extending posterior in the buttock area in a general curvilinear fashion approximately 12 cm in length. Dissection was carried through the skin and subcutaneous tissues sharply down to the tensor fasciae latae. The tensor fasciae latae tendon was incised in line with the skin incision. The hip was then held in external rotation. The external rotators were removed with electrocautery and tagged for repair. The capsule was identified and T'd and the limbs of the capsule were also tagged with FiberWire sutures for repair. The hip was then dislocated. There was noted to be marked deformity of the femoral head with marked osteophytes around the femoral neck. Utilizing the guide systems and the [facility] and [facility] instrumentation, we osteotomized the femoral neck leaving about a 1.5-cm leg length. We removed the head in its entirety. We removed soft tissue labrum circumferentially acetabulum. We removed the labrum with sharp dissection. There was noted to be multiple loose bodies, very large, within the acetabulum itself. We then reamed up to a size 61 and placed in a 61 all-metal liner and impacted this. We removed posterior wall osteophytes with a curved osteotome to contour this to anatomic position. Once this was done, our attention was turned to the proximal femur where we used a  osteotome to open up the canal in this area. We used a canal finder and then we broached up to a size 8 stem. We then used a 0 offset neck with a 55 outer diameter cup and reduced this and it was taken through a range of motion being extremely stable, not dislocating either to 90 degrees based on rotation with the hip flexed to 90 degrees. We were able to utilize the bone hook to dislocate the hip with the hip in the extreme adducted position. We removed all the trial components to the back table. We thoroughly PulsaVac lavaged.   We placed in a #8 stem with a 0 offset and a 55-diameter poly head, the dual mobility type, and reduced this, and again very stable through range of motion with good leg length reduction. We irrigated once again. We closed the external rotators and hip capsule utilizing Hewson suture passer to drill holes in the posterior superior trochanter area. Once this was repaired, we then repaired the tensor fascia with a running doubled-over suture of 1 Vicryl, deeply with 0 Vicryl, and 2-0 Vicryl and clips in the skin. EBL was 300 mL. No complications noted. The specimen was sent to pathology. He appeared to tolerate the procedure well. He will be admitted to our service.       Jarvis Hart MD      [id number]  D:  03/18/2019 12:48  T:  03/18/2019 13:46  JOB #:  [id number]

## 2019-03-18 NOTE — ANESTHESIA PROCEDURE NOTES
Spinal Block    Start time: 3/18/2019 10:53 AM  End time: 3/18/2019 10:57 AM  Performed by: Elizabeth Jimenes MD  Authorized by: Beryl Crowley MD     Pre-procedure:   Indications: primary anesthetic  Preanesthetic Checklist: patient identified, risks and benefits discussed, anesthesia consent, site marked, patient being monitored and timeout performed    Timeout Time: 10:52          Spinal Block:   Patient Position:  Seated  Prep Region:  Lumbar  Prep: chlorhexidine and patient draped      Location:  L4-5          Needle:   Needle Type:  Quincke  Needle Gauge:  25 G  Attempts:  1      Events: CSF confirmed, no blood with aspiration and no paresthesia        Assessment:  Insertion:  Uncomplicated  Patient tolerance:  Patient tolerated the procedure well with no immediate complications

## 2019-03-18 NOTE — PERIOP NOTES
TRANSFER - OUT REPORT:    Verbal report given to [facility] RN (name) on Vannessa Chester  being transferred to  (unit) for routine post - op       Report consisted of patients Situation, Background, Assessment and   Recommendations(SBAR). Information from the following report(s) SBAR, OR Summary and Procedure Summary was reviewed with the receiving nurse. Lines:   Peripheral IV 03/18/19 Left Hand (Active)   Site Assessment Clean, dry, & intact 3/18/2019 12:59 PM   Phlebitis Assessment 0 3/18/2019 12:59 PM   Infiltration Assessment 0 3/18/2019 12:59 PM   Dressing Status Clean, dry, & intact 3/18/2019 12:59 PM   Dressing Type Tape;Transparent 3/18/2019 12:59 PM   Hub Color/Line Status Green; Infusing 3/18/2019 12:59 PM        Opportunity for questions and clarification was provided. Patient transported with:       VTE prophylaxis orders have been written for Vannessa Chester. Patient and family given floor number and nurses name. Family updated re: pt status after security code verified.

## 2019-03-18 NOTE — PERIOP NOTES
Attempted to update family. No family present in surgical waiting area.  Left message with waiting area .

## 2019-03-18 NOTE — PROGRESS NOTES
TRANSFER - IN REPORT:    Verbal report received from Carole Swann RN(name) on Tito Villa  being received from [facility]) for routine post - op      Report consisted of patients Situation, Background, Assessment and   Recommendations(SBAR). Information from the following report(s) SBAR, Kardex, OR Summary, Procedure Summary, Intake/Output, MAR and Cardiac Rhythm NS was reviewed with the receiving nurse. Opportunity for questions and clarification was provided. Assessment completed upon patients arrival to unit and care assumed.

## 2019-03-18 NOTE — H&P
History and Physical Updated with no interval change.  Omar Clayton MD H&P Update: No change since previous exam.    Kobe Navarrete M.D.  3/18/2019

## 2019-03-18 NOTE — PROGRESS NOTES
Problem: Mobility Impaired (Adult and Pediatric)  Goal: *Acute Goals and Plan of Care (Insert Text)  Short Term Goals:  1.  Mr. Shasta Dailey will move from supine to sit and sit to supine in flat bed with INDEPENDENT within 3 day(s). 2.  Mr. Shasta Dailey will transfer from bed to/from chair using RW with MODIFIED INDEPENDENCE  within 3 days. 3.  Mr. Shasta Daiely will ambulate with MODIFIED INDEPENDENCE for 250+ feet with the least restrictive device within 3 day(s). 4.  Mr. Shasta Dailey will verbalize 3/3 hip precautions to ensure back safety during functional activities within 3 days. ________________________________________________________________________      PHYSICAL THERAPY: Initial Assessment and PM 3/18/2019  INPATIENT: PT Visit Days : 1  Payor: [employer] / [place] BLUE [place] BLUE ESSENTIALS BRANDI / Product Type: BRANDI /       NAME/AGE/GENDER: Shahriar Mann is a 63 y.o. male   PRIMARY DIAGNOSIS: Primary osteoarthritis of left hip [M16.12] <principal problem not specified> <principal problem not specified>  Procedure(s) (LRB):  HIP ARTHROPLASTY TOTAL/ LEFT/ GOMEZ AND NEPHEW (Left)  Day of Surgery  ICD-10: Treatment Diagnosis:    · Other abnormalities of gait and mobility (R26.89)   Precaution/Allergies:  Iodine      ASSESSMENT:     Mr. Shasta Dailey presents supine in bed following L KATHIE. He underwent R KATHIE a few months ago and is able to recall 2/3 hip precautions. He endorsed 7/10 pain, just took medication. He transitioned to sit with SBA. He stood with supervision and ambulated 100' with RW and SBA. Back to room and sat in recliner. Patient has experienced a decline in functional mobility, Mr. Shasta Dailey would benefit from skilled physical therapy (medically necessary) to address his deficits and maximize his function. Anticipate that he will progress quickly. Initiated treatment to include stand pivot transfer chair to bed and standing balance activity in room while donning a gown.   Sit to supine with SBA and cueing for position of TIANA's. Educated patient in hip precautions. This section established at most recent assessment   PROBLEM LIST (Impairments causing functional limitations):  1. Decreased ADL/Functional Activities  2. Decreased Transfer Abilities  3. Decreased Ambulation Ability/Technique  4. Increased Pain  5. Decreased Activity Tolerance  6. Decreased Knowledge of Precautions   INTERVENTIONS PLANNED: (Benefits and precautions of physical therapy have been discussed with the patient.)  1. Balance Exercise  2. Bed Mobility  3. Gait Training  4. Therapeutic Activites  5. Therapeutic Exercise/Strengthening  6. Transfer Training  7. education     TREATMENT PLAN: Frequency/Duration: twice daily for 1 week  Rehabilitation Potential For Stated Goals: Excellent     RECOMMENDED REHABILITATION/EQUIPMENT: (at time of discharge pending progress): Due to the probability of continued deficits (see above) this patient will not likely need continued skilled physical therapy after discharge. Equipment:    None at this time              HISTORY:   History of Present Injury/Illness (Reason for Referral): Admitted for above. Past Medical History/Comorbidities:   Mr. Mariano Paredes  has a past medical history of Arthritis, Asthma, Chronic pain (2000), DJD (degenerative joint disease) (01/2019), GERD (gastroesophageal reflux disease), Impetigo, unspecified, Kidney stones, and PUD (peptic ulcer disease). Mr. Mariano Paredes  has a past surgical history that includes hx tonsil and adenoidectomy; hx adenoidectomy; hx tonsillectomy; and pr total hip arthroplasty (Right, 01/2019).   Social History/Living Environment:   Home Environment: Private residence  # Steps to Enter: 1  One/Two Story Residence: One story  Living Alone: Yes  Support Systems: Family member(s)  Patient Expects to be Discharged to[de-identified] Private residence  Current DME Used/Available at Home: Walker, rolling  Prior Level of Function/Work/Activity:  Lives alone, Ind in home and community, works, drives, etc.      Number of Personal Factors/Comorbidities that affect the Plan of Care: 1-2: MODERATE COMPLEXITY   EXAMINATION:   Most Recent Physical Functioning:   Gross Assessment:  AROM: Generally decreased, functional  Strength: Generally decreased, functional               Posture:     Balance:  Sitting: Intact  Standing: Impaired  Standing - Static: Good  Standing - Dynamic : Fair Bed Mobility:  Supine to Sit: Supervision  Sit to Supine: Supervision  Scooting: Supervision  Wheelchair Mobility:     Transfers:  Sit to Stand: Supervision  Stand to Sit: Supervision  Bed to Chair: Stand-by assistance  Gait:  Left Side Weight Bearing: As tolerated  Speed/Leny: Slow  Step Length: Left shortened;Right shortened  Distance (ft): 100 Feet (ft)  Assistive Device: Walker, rolling  Ambulation - Level of Assistance: Stand-by assistance      Body Structures Involved:  1. Bones  2. Joints  3. Muscles  4. Ligaments Body Functions Affected:  1. Sensory/Pain  2. Movement Related  3. Skin Related Activities and Participation Affected:  1. Mobility  2. Self Care  3. Domestic Life  4. Community, Social and [place]   Number of elements that affect the Plan of Care: 4+: HIGH COMPLEXITY   CLINICAL PRESENTATION:   Presentation: Stable and uncomplicated: LOW COMPLEXITY   CLINICAL DECISION MAKIN [facility] Mobility Inpatient Short Form  How much difficulty does the patient currently have. .. Unable A Lot A Little None   1. Turning over in bed (including adjusting bedclothes, sheets and blankets)? [] 1   [] 2   [x] 3   [] 4   2. Sitting down on and standing up from a chair with arms ( e.g., wheelchair, bedside commode, etc.)   [] 1   [] 2   [x] 3   [] 4   3. Moving from lying on back to sitting on the side of the bed? [] 1   [] 2   [x] 3   [] 4   How much help from another person does the patient currently need. .. Total A Lot A Little None   4.   Moving to and from a bed to a chair (including a wheelchair)? [] 1   [] 2   [x] 3   [] 4   5. Need to walk in hospital room? [] 1   [] 2   [x] 3   [] 4   6. Climbing 3-5 steps with a railing? [] 1   [] 2   [x] 3   [] 4   © 2007, Trustees of [address] [place] [address] [address] [address], under license to [employer]. All rights reserved      Score:  Initial: 18 Most Recent: X (Date: -- )    Interpretation of Tool:  Represents activities that are increasingly more difficult (i.e. Bed mobility, Transfers, Gait). Medical Necessity:     · Patient is expected to demonstrate progress in strength, range of motion, balance, coordination and functional technique to increase independence with   and improve safety during all functional mobility. Reason for Services/Other Comments:  · Patient continues to require skilled intervention due to medical complications and patient unable to attend/participate in therapy as expected. Use of outcome tool(s) and clinical judgement create a POC that gives a: Clear prediction of patient's progress: LOW COMPLEXITY            TREATMENT:   (In addition to Assessment/Re-Assessment sessions the following treatments were rendered)   Pre-treatment Symptoms/Complaints:  pain  Pain: Initial:   Pain Intensity 1: 7  Pain Location 1: Hip  Pain Orientation 1: Left  Pain Intervention(s) 1: Repositioned  Post Session:  7     Therapeutic Activity: (    8 minutes): Therapeutic activities including Bed transfers, Chair transfers and education in hip precautions to improve mobility, strength and balance. Required minimal cueing   to promote maintaining hip precautions in bed. Braces/Orthotics/Lines/Etc:   · IV  · O2 Device: Room air  Treatment/Session Assessment:    · Response to Treatment:  Pleasant and cooperative.   · Interdisciplinary Collaboration:   o Physical Therapist  o Registered Nurse  · After treatment position/precautions:   o Supine in bed  o Bed/Chair-wheels locked  o Call light within reach  o RN notified  o Nurse at bedside · Compliance with Program/Exercises: Compliant all of the time, Will assess as treatment progresses  · Recommendations/Intent for next treatment session: \"Next visit will focus on advancements to more challenging activities and reduction in assistance provided\".   Total Treatment Duration:  PT Patient Time In/Time Out  Time In: 1605  Time Out: [address], PT, DPT

## 2019-03-18 NOTE — BRIEF OP NOTE
BRIEF OPERATIVE NOTE    Date of Procedure: 3/18/2019   Preoperative Diagnosis: Primary osteoarthritis of left hip [M16.12]  Postoperative Diagnosis: Primary osteoarthritis of left hip [M16.12]    Procedure(s):  HIP ARTHROPLASTY TOTAL/ LEFT/ SMITH AND NEPHEW  Surgeon(s) and Role:     * Fely Montes MD - Primary     Chestine Cogan, MD - Co-Surgeon         Surgical Assistant: see above    Surgical Staff:  Circ-1: Leopoldo Dumas, RN  Circ-2: Luis Lerma RN  Circ-Relief: Franklin Sandy RN  Scrub Tech-1: Jenny, Mikey  Scrub Tech-2: Starling Neigh  Event Time In Time Out   Incision Start 1130    Incision Close       Anesthesia: Spinal   Estimated Blood Loss: 300 ml  Specimens: * No specimens in log *   Findings: severe DJD   Complications: none noted  Implants:   Implant Name Type Inv.  Item Serial No.  Lot No. LRB No. Used Action   [employer] Shell Ti-Plasma Stainless steel      [id number] Left 1 Implanted   STEM FEM CEMLS STD CONE SZ 8 -- POLARSTEM - [id number]  STEM FEM CEMLS STD CONE SZ 8 -- [facility] AND NEPH ORTHOPEDIC [id number] Left 1 Implanted   COCR FEMORAL HEAD GT 28OD +0 - [id number] Joint Component COCR FEMORAL HEAD GT 28OD +0  SMITH \T\ NEPHEW WOUND CARE [id number] Left 1 Implanted   [employer] XLPE Insert X-linked     [id number] Left 1 Implanted

## 2019-03-19 PROBLEM — M15.9 DJD (DEGENERATIVE JOINT DISEASE), MULTIPLE SITES: Status: ACTIVE | Noted: 2019-03-19

## 2019-03-19 LAB — HGB BLD-MCNC: 12 G/DL (ref 13.6–17.2)

## 2019-03-19 PROCEDURE — 85018 HEMOGLOBIN: CPT

## 2019-03-19 PROCEDURE — 86580 TB INTRADERMAL TEST: CPT | Performed by: ORTHOPAEDIC SURGERY

## 2019-03-19 PROCEDURE — 36415 COLL VENOUS BLD VENIPUNCTURE: CPT

## 2019-03-19 PROCEDURE — 65270000029 HC RM PRIVATE

## 2019-03-19 PROCEDURE — 77030020263 HC SOL INJ SOD CL0.9% LFCR 1000ML

## 2019-03-19 PROCEDURE — 97110 THERAPEUTIC EXERCISES: CPT

## 2019-03-19 PROCEDURE — 97530 THERAPEUTIC ACTIVITIES: CPT

## 2019-03-19 PROCEDURE — 74011250637 HC RX REV CODE- 250/637: Performed by: ORTHOPAEDIC SURGERY

## 2019-03-19 PROCEDURE — 74011000302 HC RX REV CODE- 302: Performed by: ORTHOPAEDIC SURGERY

## 2019-03-19 PROCEDURE — 97165 OT EVAL LOW COMPLEX 30 MIN: CPT

## 2019-03-19 PROCEDURE — 74011250637 HC RX REV CODE- 250/637: Performed by: NURSE PRACTITIONER

## 2019-03-19 PROCEDURE — 74011250636 HC RX REV CODE- 250/636: Performed by: ORTHOPAEDIC SURGERY

## 2019-03-19 RX ORDER — DOCUSATE SODIUM 100 MG/1
200 CAPSULE, LIQUID FILLED ORAL ONCE
Status: COMPLETED | OUTPATIENT
Start: 2019-03-19 | End: 2019-03-19

## 2019-03-19 RX ORDER — ACETAMINOPHEN 325 MG/1
650 TABLET ORAL ONCE
Status: COMPLETED | OUTPATIENT
Start: 2019-03-19 | End: 2019-03-19

## 2019-03-19 RX ADMIN — Medication 1 AMPULE: at 08:19

## 2019-03-19 RX ADMIN — ASPIRIN 325 MG: 325 TABLET, DELAYED RELEASE ORAL at 21:37

## 2019-03-19 RX ADMIN — ASPIRIN 325 MG: 325 TABLET, DELAYED RELEASE ORAL at 08:19

## 2019-03-19 RX ADMIN — SODIUM CHLORIDE 100 ML/HR: 900 INJECTION, SOLUTION INTRAVENOUS at 00:08

## 2019-03-19 RX ADMIN — ACETAMINOPHEN 650 MG: 325 TABLET, FILM COATED ORAL at 12:36

## 2019-03-19 RX ADMIN — Medication 10 ML: at 13:04

## 2019-03-19 RX ADMIN — Medication 5 ML: at 06:28

## 2019-03-19 RX ADMIN — Medication 2 G: at 06:28

## 2019-03-19 RX ADMIN — HYDROCODONE BITARTRATE AND ACETAMINOPHEN 1 TABLET: 5; 325 TABLET ORAL at 21:37

## 2019-03-19 RX ADMIN — TUBERCULIN PURIFIED PROTEIN DERIVATIVE 5 UNITS: 5 INJECTION, SOLUTION INTRADERMAL at 08:19

## 2019-03-19 RX ADMIN — DEXAMETHASONE SODIUM PHOSPHATE 10 MG: 10 INJECTION INTRAMUSCULAR; INTRAVENOUS at 12:36

## 2019-03-19 RX ADMIN — CELECOXIB 200 MG: 200 CAPSULE ORAL at 08:19

## 2019-03-19 RX ADMIN — Medication 5 ML: at 21:38

## 2019-03-19 RX ADMIN — DOCUSATE SODIUM 200 MG: 100 CAPSULE, LIQUID FILLED ORAL at 21:37

## 2019-03-19 RX ADMIN — CELECOXIB 200 MG: 200 CAPSULE ORAL at 21:37

## 2019-03-19 RX ADMIN — ENOXAPARIN SODIUM 40 MG: 40 INJECTION SUBCUTANEOUS at 08:19

## 2019-03-19 RX ADMIN — Medication 1 AMPULE: at 21:39

## 2019-03-19 RX ADMIN — HYDROCODONE BITARTRATE AND ACETAMINOPHEN 1 TABLET: 5; 325 TABLET ORAL at 08:18

## 2019-03-19 RX ADMIN — SENNOSIDES AND DOCUSATE SODIUM 2 TABLET: 8.6; 5 TABLET ORAL at 08:19

## 2019-03-19 NOTE — INTERDISCIPLINARY ROUNDS
Interdisciplinary team rounds were held 3/19/2019 with the following team members:Care Management, Nursing, Occupational Therapy and Physician and the patient. Plan of care discussed. See clinical pathway and/or care plan for interventions and desired outcomes. Pt planned to discharge to home and continue outpatient PT.

## 2019-03-19 NOTE — PROGRESS NOTES
CM met with patient per consult for \"home health\" to discuss discharge planning. SOCIAL: 
Patient lives alone in a 1 level house with 0 steps at primary entrance, and walk-in shower. He works full time at [employer]. This is his source of income. His sister is coming to stay with him after discharge to assist in his care needs. Additional support from friends and neighbors. Strong social support identified. Patient is not a . His insurance is confirmed as Stefany Shaffer. RX are affordable per patient. PCP is confirmed as Dr. Saleem Perera; last visit in January 2019. No history of mental health or substance abuse issues. MOBILITY / HISTORY: At baseline, patient is very active and independent. Ambulates without devices. Drives himself. Manages ADLs independently. DME - rolling walker, crutches. No home oxygen. No dialysis history. No caregivers, aids, CLTC hours. HH history with [facility] after prior hip replacement. No STR history. PLAN FOR DISCHARGE: 
OP PT has been coordinated through Dr. Mulu Nunn office at [facility]. No other needs identified at this time. Please reconsult CM if new needs arise. Care Management Interventions PCP Verified by CM: Yes([facility]) Mode of Transport at Discharge: Other (see comment)(sister) Transition of Care Consult (CM Consult): Discharge Planning Discharge Durable Medical Equipment: No(patient has RW / crutches) Physical Therapy Consult: Yes Occupational Therapy Consult: Yes Speech Therapy Consult: No 
Current Support Network: Own Home, Lives Alone(Lives alone; sister will stay with him after discharge.) Confirm Follow Up Transport: Family(Sister to stay with patient after discharge.) Plan discussed with Pt/Family/Caregiver: Yes(Spoke with patient in room.) Discharge Location Discharge Placement: Home with outpatient services(OP PT through [facility], set up through Dr. Mulu Nunn office.)

## 2019-03-19 NOTE — PROGRESS NOTES
Problem: Self Care Deficits Care Plan (Adult) Goal: *Acute Goals and Plan of Care (Insert Text) 1. Patient will verbalize and demonstrate understanding of hip precautions with 100% accuracy during ADL. 2. Patient will complete functional transfers with modified independence and adaptive equipment as needed. 3. Patient will complete lower body bathing and dressing with setup and adaptive equipment as needed. 4. Patient will complete toileting with modified independence. 5. Patient will tolerate at least 20 minutes of BUE therapeutic exercises to increase strength in BUE to aid in functional transfers. 6. Patient will tolerate at least 20 minutes of OT treatment with no rest breaks to increase activity tolerance for ADLs. Timeframe: 7 visits OCCUPATIONAL THERAPY: Initial Assessment and Daily Note 3/19/2019INPATIENT: OT Visit Days: 1 Payor: Richard Lewis / [place] BLUE ESSENTIALS BRANDI / Product Type: BRANDI /  
  
NAME/AGE/GENDER: Mahogany Jiang is a 63 y.o. male PRIMARY DIAGNOSIS:  Primary osteoarthritis of left hip [M16.12] DJD (degenerative joint disease), multiple sites [M15.9] <principal problem not specified> <principal problem not specified> Procedure(s) (LRB): HIP ARTHROPLASTY TOTAL/ LEFT/ GOMEZ AND NEPHEW (Left) 1 Day Post-Op ICD-10: Treatment Diagnosis:  
 · Pain in left hip (M25.552) · Stiffness of Left Hip, Not elsewhere classified (M25.652) Precautions/Allergies: WBAT LLE 
HIP PRECAUTIONS Iodine ASSESSMENT:  
Mr. Tiffanie Beatty is a 63year old male who is now s/p L KATHIE and WBAT in LLE. Patient had R KATHIE in Jan 2019. He lives alone and had regained independence with ADLs, IADLs, driving, and working after last surgery. He has a reacher, sock aid, etc from previous surgery. 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 Patient supine in bed upon arrival, agreeable to OT evaluation.  Reports pain 4/10 in hip. Alert and oriented. Able to verbalize 2/3 hip precautions. BUE assessment reveals ROM, strength, coordination are all WNL. Balance is impaired in sitting (fair) and standing (fair). Treatment initiated to include bed mobility with minimal assistance and verbal cues. Once seated edge of bed patient reported he felt like he was going to faint. Returned to supine with maximal assistance. BP cuff obtained and checked: 124/76 (after several minutes of being in supine). Attempted supine to sit again with minimal assistance and BP rechecked: 132/ 79 and patient did not c/o any dizziness. Able to stand with CGA and RW and take steps in room with CGA and RW. Required CGA and verbal cues for transfer to chair. Patient reported he felt much better after sitting in chair and pain decreased to 1/10. He is currently functioning below his baseline and would benefit from continued occupational therapy to increase independence and safety. Will follow. This section established at most recent assessment PROBLEM LIST (Impairments causing functional limitations): 1. Decreased ADL/Functional Activities 2. Decreased Transfer Abilities 3. Decreased Ambulation Ability/Technique 4. Decreased Balance 5. Increased Pain 6. Decreased Activity Tolerance 7. Decreased Knowledge of Precautions INTERVENTIONS PLANNED: (Benefits and precautions of occupational therapy have been discussed with the patient.) 1. Activities of daily living training 2. Adaptive equipment training 3. Group therapy 4. Therapeutic activity 5. Therapeutic exercise TREATMENT PLAN: Frequency/Duration: Follow patient 5x/ week to address above goals. Rehabilitation Potential For Stated Goals: Excellent RECOMMENDED REHABILITATION/EQUIPMENT: (at time of discharge pending progress): Due to the probability of continued deficits (see above) this patient will not likely need continued skilled occupational therapy after discharge. Equipment: ? None at this time OCCUPATIONAL PROFILE AND HISTORY:  
History of Present Injury/Illness (Reason for Referral): S/p above procedure Past Medical History/Comorbidities:  
Mr. Uche Zepeda  has a past medical history of Arthritis, Asthma, Chronic pain (2000), DJD (degenerative joint disease) (01/2019), GERD (gastroesophageal reflux disease), Impetigo, unspecified, Kidney stones, and PUD (peptic ulcer disease). Mr. Uche Zepeda  has a past surgical history that includes hx tonsil and adenoidectomy; hx adenoidectomy; hx tonsillectomy; and pr total hip arthroplasty (Right, 01/2019). Social History/Living Environment:  
Home Environment: Private residence # Steps to Enter: 1 One/Two Story Residence: One story Living Alone: Yes Support Systems: Family member(s) Patient Expects to be Discharged to[de-identified] Private residence Current DME Used/Available at Home: Adaptive dressing aides, Adaptive bathing aides, Uche Zepeda, rolling Prior Level of Function/Work/Activity: 
 Patient had R KATHIE in Jan 2019. He lives alone and had regained independence with ADLs, IADLs, driving, and working after last surgery. He has a reacher, sock aid, etc from previous surgery. 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 Number of Personal Factors/Comorbidities that affect the Plan of Care: Brief history (0):  LOW COMPLEXITY ASSESSMENT OF OCCUPATIONAL PERFORMANCE[de-identified]  
Activities of Daily Living:  
Basic ADLs (From Assessment) Complex ADLs (From Assessment) Feeding: Setup Oral Facial Hygiene/Grooming: Setup Bathing: Minimum assistance Upper Body Dressing: Setup Lower Body Dressing: Minimum assistance Toileting: Minimum assistance Instrumental ADL Meal Preparation: Moderate assistance Homemaking: Moderate assistance Medication Management: Independent Financial Management: Independent Grooming/Bathing/Dressing Activities of Daily Living Cognitive Retraining Safety/Judgement: Awareness of environment; Fall prevention Bed/Mat Mobility Supine to Sit: Minimum assistance Sit to Supine: Maximum assistance Sit to Stand: Contact guard assistance Stand to Sit: Contact guard assistance Bed to Chair: Contact guard assistance Scooting: Contact guard assistance Most Recent Physical Functioning:  
Gross Assessment: 
AROM: Within functional limits Strength: Within functional limits Posture: 
  
Balance: 
Sitting: Impaired Sitting - Static: Fair (occasional) Sitting - Dynamic: Fair (occasional) Standing: Impaired Standing - Static: Good Standing - Dynamic : Fair Bed Mobility: 
Supine to Sit: Minimum assistance Sit to Supine: Maximum assistance Scooting: Contact guard assistance Wheelchair Mobility: 
  
Transfers: 
Sit to Stand: Contact guard assistance Stand to Sit: Contact guard assistance Bed to Chair: Contact guard assistance Patient Vitals for the past 6 hrs: 
 BP BP Patient Position SpO2 Pulse 03/19/19 0748 122/75 At rest 93 % 82 Mental Status Neurologic State: Alert Orientation Level: Oriented X4 Cognition: Follows commands Perception: Appears intact Perseveration: No perseveration noted Safety/Judgement: Awareness of environment, Fall prevention Physical Skills Involved: 1. Range of Motion 2. Balance 3. Activity Tolerance 4. Pain (acute) Cognitive Skills Affected (resulting in the inability to perform in a timely and safe manner): 1. None  Psychosocial Skills Affected: 1. Habits/Routines 2. Environmental Adaptation 3. Social Roles Number of elements that affect the Plan of Care: 5+:  HIGH COMPLEXITY CLINICAL DECISION MAKING:  
[employer] AM-PAC 6 Clicks Daily Activity Inpatient Short Form How much help from another person does the patient currently need. .. Total A Lot A Little None 1.  Putting on and taking off regular lower body clothing? [] 1   [] 2   [x] 3   [] 4  
2. Bathing (including washing, rinsing, drying)? [] 1   [] 2   [x] 3   [] 4  
3. Toileting, which includes using toilet, bedpan or urinal?   [] 1   [] 2   [x] 3   [] 4  
4. Putting on and taking off regular upper body clothing? [] 1   [] 2   [x] 3   [] 4  
5. Taking care of personal grooming such as brushing teeth? [] 1   [] 2   [x] 3   [] 4  
6. Eating meals? [] 1   [] 2   [x] 3   [] 4  
© 2007, Trustees of [address] [place] [address] [address] [address], under license to [employer]. All rights reserved Score:  Initial: 18 Most Recent: X (Date: -- ) Interpretation of Tool:  Represents activities that are increasingly more difficult (i.e. Bed mobility, Transfers, Gait). Medical Necessity:    
· Patient demonstrates excellent rehab potential due to higher previous functional level. Reason for Services/Other Comments: 
· Patient continues to require present interventions due to patient's inability to care for self while maintaining hip precautions . Use of outcome tool(s) and clinical judgement create a POC that gives a: LOW COMPLEXITY  
 
 
 
TREATMENT:  
(In addition to Assessment/Re-Assessment sessions the following treatments were rendered) Pre-treatment Symptoms/Complaints: Dizziness with movement that resolved with time Pain: Initial:  
Pain Intensity 1: 4(4 before therapy, 1 after therapy )  Post Session:  1/10 Therapeutic Activity: (    8 minutes): Therapeutic activities including Bed transfers, Chair transfers and Ambulation on level ground to improve mobility, balance and activity tolerance for ADLs. Required minimal   to promote dynamic balance in standing. Braces/Orthotics/Lines/Etc:  
· IV 
· O2 Device: Room air Treatment/Session Assessment:   
· Response to Treatment:  Tolerated well once dizziness resolved; motivated · Interdisciplinary Collaboration:  
o Physical Therapy Assistant o Registered Nurse · After treatment position/precautions:  
o Up in chair 
o Bed/Chair-wheels locked 
o Bed in low position 
o Call light within reach 
o RN notified · Compliance with Program/Exercises: Compliant all of the time. · Recommendations/Intent for next treatment session: \"Next visit will focus on advancements to more challenging activities and reduction in assistance provided\". Total Treatment Duration: OT Patient Time In/Time Out Time In: 1982 Time Out: 0490 Noreenkianna Zimmerman OTR/L

## 2019-03-19 NOTE — PROGRESS NOTES
Problem: Mobility Impaired (Adult and Pediatric) Goal: *Acute Goals and Plan of Care (Insert Text) Short Term Goals: 1.  Mr. Jasmyn Gardiner will move from supine to sit and sit to supine in flat bed with INDEPENDENT within 3 day(s). 2.  Mr. aJsmyn Gardiner will transfer from bed to/from chair using RW with MODIFIED INDEPENDENCE  within 3 days. 3.  Mr. Jasmyn Gardiner will ambulate with MODIFIED INDEPENDENCE for 250+ feet with the least restrictive device within 3 day(s). 4.  Mr. Jasmyn Gardiner will verbalize 3/3 hip precautions to ensure back safety during functional activities within 3 days. ________________________________________________________________________ PHYSICAL THERAPY: Daily Note and PM 3/19/2019INPATIENT: PT Visit Days : 2 Payor: Luigi Ramirez / Plan[place] BLUE [employer] BRANDI / Product Type: BRANDI /   
  
NAME/AGE/GENDER: Anthony Hinojosa is a 63 y.o. male PRIMARY DIAGNOSIS: Primary osteoarthritis of left hip [M16.12] DJD (degenerative joint disease), multiple sites [M15.9] <principal problem not specified> <principal problem not specified> Procedure(s) (LRB): HIP ARTHROPLASTY TOTAL/ LEFT/ GOMEZ AND NEPHEW (Left) 1 Day Post-Op ICD-10: Treatment Diagnosis:  
 · Other abnormalities of gait and mobility (R26.89) Precaution/Allergies: 
Iodine ASSESSMENT:  
Mr. Jasmyn Gardiner presents sitting in recliner and agreeable to therapy. He underwent R KATHIE a few months ago. He endorsed 7/10 pain, just took medication. He  Sit to stand  with SBA. He stood with supervision and ambulated 500 with RW and SBA. Back to room and sat in recliner. Patient instructed in therapeutic exercises, tolerated well. Good session. Patient is very pleasant and cooperative. He was here a few months ago. Patient has experienced a decline in functional mobility, Mr. Jasmyn Gardiner would benefit from skilled physical therapy (medically necessary) to address his deficits and maximize his function.   Anticipate that he will progress quickly. MD visits during the treatment session. Will continue PT efforts. PM treatment:  Patient is sitting in the recliner and agreeable to therapy. Patient is able to scoot to the edge of the chair, sit to stand and maintain standing balance with supervision to contact guard assist.  Gait distance maintained. Patient is returned to supine in bed at the end of the treatment session with needs within reach. Good session and steady progress demonstrated. Will continue PT efforts. This section established at most recent assessment PROBLEM LIST (Impairments causing functional limitations): 1. Decreased ADL/Functional Activities 2. Decreased Transfer Abilities 3. Decreased Ambulation Ability/Technique 4. Increased Pain 5. Decreased Activity Tolerance 6. Decreased Knowledge of Precautions INTERVENTIONS PLANNED: (Benefits and precautions of physical therapy have been discussed with the patient.) 1. Balance Exercise 2. Bed Mobility 3. Gait Training 4. Therapeutic Activites 5. Therapeutic Exercise/Strengthening 6. Transfer Training 7. education TREATMENT PLAN: Frequency/Duration: twice daily for 1 week Rehabilitation Potential For Stated Goals: Excellent RECOMMENDED REHABILITATION/EQUIPMENT: (at time of discharge pending progress): Due to the probability of continued deficits (see above) this patient will not likely need continued skilled physical therapy after discharge. Equipment:  
? None at this time HISTORY:  
History of Present Injury/Illness (Reason for Referral): Admitted for above. Past Medical History/Comorbidities:  
Mr. Glen Nageotte  has a past medical history of Arthritis, Asthma, Chronic pain (2000), DJD (degenerative joint disease) (01/2019), GERD (gastroesophageal reflux disease), Impetigo, unspecified, Kidney stones, and PUD (peptic ulcer disease).   Mr. Glen Nageotte  has a past surgical history that includes hx tonsil and adenoidectomy; hx adenoidectomy; hx tonsillectomy; and pr total hip arthroplasty (Right, 2019). Social History/Living Environment:  
Home Environment: Private residence # Steps to Enter: 1 One/Two Story Residence: One story Living Alone: Yes Support Systems: Family member(s) Patient Expects to be Discharged to[de-identified] Private residence Current DME Used/Available at Home: Adaptive dressing aides, Adaptive bathing aides, Elveria Portillo, rolling Prior Level of Function/Work/Activity: 
Lives alone, Ind in home and community, works, drives, etc. 
   
Number of Personal Factors/Comorbidities that affect the Plan of Care: 1-2: [address] [address] EXAMINATION:  
Most Recent Physical Functioning:  
Gross Assessment: 
  
         
  
Posture: 
  
Balance: 
Sitting: Intact Sitting - Static: Good (unsupported) Sitting - Dynamic: Good (unsupported) Standing: Intact Standing - Static: Good Standing - Dynamic : Good Bed Mobility: 
  
Wheelchair Mobility: 
  
Transfers: 
Sit to Stand: Contact guard assistance Stand to Sit: Contact guard assistance Gait: 
Left Side Weight Bearing: As tolerated Speed/Leny: Fluctuations Distance (ft): 500 Feet (ft) Assistive Device: Gait belt;Walker, rolling Ambulation - Level of Assistance: Contact guard assistance Body Structures Involved: 1. Bones 2. Joints 3. Muscles 4. Ligaments Body Functions Affected: 1. Sensory/Pain 2. Movement Related 3. Skin Related Activities and Participation Affected: 1. Mobility 2. Self Care 3. Domestic Life 4. Community, Social and [place] Number of elements that affect the Plan of Care: 4+: HIGH COMPLEXITY CLINICAL PRESENTATION:  
Presentation: Stable and uncomplicated: LOW COMPLEXITY CLINICAL DECISION MAKIN [facility] [address] [id number] AM-PAC 6 Clicks Basic Mobility Inpatient Short Form How much difficulty does the patient currently have. .. Unable A Lot A Little None 1.  Turning over in bed (including adjusting bedclothes, sheets and blankets)? [] 1   [] 2   [x] 3   [] 4  
2. Sitting down on and standing up from a chair with arms ( e.g., wheelchair, bedside commode, etc.)   [] 1   [] 2   [x] 3   [] 4  
3. Moving from lying on back to sitting on the side of the bed? [] 1   [] 2   [x] 3   [] 4 How much help from another person does the patient currently need. .. Total A Lot A Little None 4. Moving to and from a bed to a chair (including a wheelchair)? [] 1   [] 2   [x] 3   [] 4  
5. Need to walk in hospital room? [] 1   [] 2   [x] 3   [] 4  
6. Climbing 3-5 steps with a railing? [] 1   [] 2   [x] 3   [] 4  
© 2007, Trustees of [address] [place] [address], under license to [employer]. All rights reserved Score:  Initial: 18 Most Recent: X (Date: -- ) Interpretation of Tool:  Represents activities that are increasingly more difficult (i.e. Bed mobility, Transfers, Gait). Medical Necessity:    
· Patient is expected to demonstrate progress in strength, range of motion, balance, coordination and functional technique to increase independence with   and improve safety during all functional mobility. Reason for Services/Other Comments: 
· Patient continues to require skilled intervention due to medical complications and patient unable to attend/participate in therapy as expected. Use of outcome tool(s) and clinical judgement create a POC that gives a: Clear prediction of patient's progress: LOW COMPLEXITY  
  
 
 
 
TREATMENT:  
(In addition to Assessment/Re-Assessment sessions the following treatments were rendered) Pre-treatment Symptoms/Complaints:  \" hello \" Pain: Initial:  
Pain Intensity 1: 0  Post Session:  0/10 Therapeutic Activity: (    13 minutes): Therapeutic activities including Bed transfers, Chair transfers and education in hip precautions to improve mobility, strength and balance.   Required minimal cueing   to promote maintaining hip precautions in bed. Therapeutic Exercise: (  minutes):  Exercises per grid below to improve mobility, strength, balance and coordination. Required minimum  verbal cues to promote proper body breathing techniques. Progressed repetitions and complexity of movement as indicated. Date: 
3/19/19 Date: 
 Date: 
  
ACTIVITY/EXERCISE AM PM AM PM AM PM  
Ankle pumps 30 marching 30 LAQ 30       
        
        
        
        
B = bilateral; AA = active assistive; A = active; P = passive Braces/Orthotics/Lines/Etc:  
· IV 
· O2 Device: Room air Treatment/Session Assessment:   
· Response to Treatment:  Pleasant and cooperative · Interdisciplinary Collaboration:  
o Physical Therapy Assistant 
o Registered Nurse · After treatment position/precautions:  
o Supine in bed 
o Bed/Chair-wheels locked 
o Bed in low position 
o Call light within reach 
o RN notified · Compliance with Program/Exercises: Compliant all of the time · Recommendations/Intent for next treatment session: \"Next visit will focus on advancements to more challenging activities and reduction in assistance provided\". Total Treatment Duration: PT Patient Time In/Time Out Time In: 2891 Time Out: 1344 Marry West, PTA

## 2019-03-19 NOTE — PROGRESS NOTES
Problem: Falls - Risk of  Goal: *Absence of Falls  Document Milly Fall Risk and appropriate interventions in the flowsheet.   Outcome: Progressing Towards Goal  Fall Risk Interventions:  Mobility Interventions: Bed/chair exit alarm, Communicate number of staff needed for ambulation/transfer, OT consult for ADLs, PT Consult for mobility concerns, Patient to call before getting OOB         Medication Interventions: Bed/chair exit alarm, Patient to call before getting OOB, Teach patient to arise slowly    Elimination Interventions: Bed/chair exit alarm, Call light in reach, Patient to call for help with toileting needs, Toilet paper/wipes in reach, Toileting schedule/hourly rounds

## 2019-03-19 NOTE — PROGRESS NOTES
Problem: Falls - Risk of 
Goal: *Absence of Falls Document Domingo Quick Fall Risk and appropriate interventions in the flowsheet. Outcome: Progressing Towards Goal 
Fall Risk Interventions: 
Mobility Interventions: Bed/chair exit alarm, Communicate number of staff needed for ambulation/transfer, Patient to call before getting OOB Medication Interventions: Bed/chair exit alarm, Teach patient to arise slowly, Patient to call before getting OOB Elimination Interventions: Bed/chair exit alarm, Call light in reach, Patient to call for help with toileting needs, Toilet paper/wipes in reach, Toileting schedule/hourly rounds

## 2019-03-19 NOTE — PROGRESS NOTES
Orthopedic Joint Progress Note 2019 Admit Date: 3/18/2019 Admit Diagnosis: Primary osteoarthritis of left hip [M16.12] DJD (degenerative joint disease), multiple sites [M15.9] 1 Day Post-Op Subjective:  
 
Tonya Cotter alert oriented and had pain last night, but much improved today with FWB ambulation in the hallway. Good reciprocal gait pattern. Review of Systems: Pertinent items are noted in HPI. Objective:  
 
PT/OT:  
 
PATIENT MOBILITY Bed Mobility Supine to Sit: Minimum assistance Sit to Supine: Maximum assistance Scooting: Contact guard assistance Transfers Sit to Stand: Contact guard assistance Stand to Sit: Contact guard assistance Bed to Chair: Contact guard assistance Gait Speed/Leny: Slow Step Length: Left shortened, Right shortened Ambulation - Level of Assistance: Stand-by assistance Distance (ft): 100 Feet (ft) Assistive Device: Walker, rolling Weight Bearing Status Left Side Weight Bearing: As tolerated Vital Signs:   
Blood pressure 122/75, pulse 82, temperature 100 °F (37.8 °C), resp. rate 20, height 6' 5\" (1.956 m), weight 90.7 kg (200 lb), SpO2 93 %. Temp (24hrs), Av.8 °F (37.1 °C), Min:97.1 °F (36.2 °C), Max:100 °F (37.8 °C) Pain Control:  
Pain Assessment Pain Scale 1: Numeric (0 - 10) Pain Intensity 1: 1 Pain Onset 1: postop Pain Location 1: Hip Pain Orientation 1: Left Pain Description 1: Aching Pain Intervention(s) 1: Repositioned Meds: 
Current Facility-Administered Medications Medication Dose Route Frequency  alcohol 62% (NOZIN) nasal  1 Ampule  1 Ampule Topical Q12H  
 0.9% sodium chloride infusion  100 mL/hr IntraVENous CONTINUOUS  
 sodium chloride (NS) flush 5-40 mL  5-40 mL IntraVENous Q8H  
 sodium chloride (NS) flush 5-40 mL  5-40 mL IntraVENous PRN  
 celecoxib (CELEBREX) capsule 200 mg  200 mg Oral Q12H  naloxone (NARCAN) injection 0.2-0.4 mg  0.2-0.4 mg IntraVENous Q10MIN PRN  
 dexamethasone (DECADRON) injection 10 mg  10 mg IntraVENous ONCE  
 diphenhydrAMINE (BENADRYL) capsule 25 mg  25 mg Oral Q4H PRN  
 aspirin delayed-release tablet 325 mg  325 mg Oral Q12H  
 tuberculin injection 5 Units  5 Units IntraDERMal ONCE  
 HYDROcodone-acetaminophen (NORCO) 5-325 mg per tablet 1 Tab  1 Tab Oral Q4H PRN  
 HYDROmorphone (PF) (DILAUDID) injection 1 mg  1 mg IntraVENous Q3H PRN  
 senna-docusate (PERICOLACE) 8.6-50 mg per tablet 2 Tab  2 Tab Oral DAILY  enoxaparin (LOVENOX) injection 40 mg  40 mg SubCUTAneous DAILY  
  
 
LAB:   
Lab Results Component Value Date/Time INR 1.0 01/07/2019 07:16 AM  
 
Lab Results Component Value Date/Time HGB 12.0 (L) 03/19/2019 04:10 AM  
 HGB 13.7 03/13/2019 03:01 PM  
 HGB 12.1 (L) 01/08/2019 05:29 AM  
 
 
Wound Hip Right (Active) Number of days: 70 Wound Hip Left (Active) Dressing Status  Clean, dry, and intact 3/18/2019  7:57 PM  
Dressing Type  ABD pad;Special tape (comment) 3/18/2019  7:57 PM  
Number of days: 1 Physical Exam: No significant changes. NVI, no new drainage. Assessment:  
  
Active Problems: 
  DJD (degenerative joint disease), multiple sites (3/19/2019) Plan:  
 
Continue PT/OT/Rehab Consult: Rehab team including PT, OT, recreational therapy, and  Patient Expects to be Discharged to[de-identified] Private residence Anticipate discharge tomorrow.  
  
Signed By: Junaa New MD

## 2019-03-19 NOTE — PROGRESS NOTES
Problem: Falls - Risk of 
Goal: *Absence of Falls Document Kim Cadet Fall Risk and appropriate interventions in the flowsheet. Outcome: Progressing Towards Goal 
Fall Risk Interventions: 
Mobility Interventions: Bed/chair exit alarm, Communicate number of staff needed for ambulation/transfer, Patient to call before getting OOB Medication Interventions: Bed/chair exit alarm, Teach patient to arise slowly, Patient to call before getting OOB Elimination Interventions: Bed/chair exit alarm, Call light in reach, Patient to call for help with toileting needs, Toilet paper/wipes in reach, Toileting schedule/hourly rounds

## 2019-03-19 NOTE — PROGRESS NOTES
Problem: Mobility Impaired (Adult and Pediatric) Goal: *Acute Goals and Plan of Care (Insert Text) Short Term Goals: 1.  Mr. Glen Nageotte will move from supine to sit and sit to supine in flat bed with INDEPENDENT within 3 day(s). 2.  Mr. Glen Nageotte will transfer from bed to/from chair using RW with MODIFIED INDEPENDENCE  within 3 days. 3.  Mr. Glen Nageotte will ambulate with MODIFIED INDEPENDENCE for 250+ feet with the least restrictive device within 3 day(s). 4.  Mr. Glen Nageotte will verbalize 3/3 hip precautions to ensure back safety during functional activities within 3 days. ________________________________________________________________________ PHYSICAL THERAPY: Daily Note and AM 3/19/2019INPATIENT: PT Visit Days : 2 Payor: Martha Villa / [place] BLUE E[employer]ENTIALS BRANDI / Product Type: BRANDI /   
  
NAME/AGE/GENDER: Sami Godinez is a 63 y.o. male PRIMARY DIAGNOSIS: Primary osteoarthritis of left hip [M16.12] DJD (degenerative joint disease), multiple sites [M15.9] <principal problem not specified> <principal problem not specified> Procedure(s) (LRB): HIP ARTHROPLASTY TOTAL/ LEFT/ GOMEZ AND NEPHEW (Left) 1 Day Post-Op ICD-10: Treatment Diagnosis:  
 · Other abnormalities of gait and mobility (R26.89) Precaution/Allergies: 
Iodine ASSESSMENT:  
Mr. Glen Nageotte presents sitting in recliner and agreeable to therapy. He underwent R KATHIE a few months ago. He endorsed 7/10 pain, just took medication. He  Sit to stand  with SBA. He stood with supervision and ambulated 500 with RW and SBA. Back to room and sat in recliner. Patient instructed in therapeutic exercises, tolerated well. Good session. Patient is very pleasant and cooperative. He was here a few months ago. Patient has experienced a decline in functional mobility, Mr. Glen Nageotte would benefit from skilled physical therapy (medically necessary) to address his deficits and maximize his function.   Anticipate that he will progress quickly. MD visits during the treatment session. Will continue PT efforts. This section established at most recent assessment PROBLEM LIST (Impairments causing functional limitations): 1. Decreased ADL/Functional Activities 2. Decreased Transfer Abilities 3. Decreased Ambulation Ability/Technique 4. Increased Pain 5. Decreased Activity Tolerance 6. Decreased Knowledge of Precautions INTERVENTIONS PLANNED: (Benefits and precautions of physical therapy have been discussed with the patient.) 1. Balance Exercise 2. Bed Mobility 3. Gait Training 4. Therapeutic Activites 5. Therapeutic Exercise/Strengthening 6. Transfer Training 7. education TREATMENT PLAN: Frequency/Duration: twice daily for 1 week Rehabilitation Potential For Stated Goals: Excellent RECOMMENDED REHABILITATION/EQUIPMENT: (at time of discharge pending progress): Due to the probability of continued deficits (see above) this patient will not likely need continued skilled physical therapy after discharge. Equipment:  
? None at this time HISTORY:  
History of Present Injury/Illness (Reason for Referral): Admitted for above. Past Medical History/Comorbidities:  
Mr. Izzy Baxter  has a past medical history of Arthritis, Asthma, Chronic pain (2000), DJD (degenerative joint disease) (01/2019), GERD (gastroesophageal reflux disease), Impetigo, unspecified, Kidney stones, and PUD (peptic ulcer disease). Mr. Izzy Baxter  has a past surgical history that includes hx tonsil and adenoidectomy; hx adenoidectomy; hx tonsillectomy; and pr total hip arthroplasty (Right, 01/2019). Social History/Living Environment:  
Home Environment: Private residence # Steps to Enter: 1 One/Two Story Residence: One story Living Alone: Yes Support Systems: Family member(s) Patient Expects to be Discharged to[de-identified] Private residence Current DME Used/Available at Home: Adaptive dressing aides, Adaptive bathing aides, montrell Gore Prior Level of Function/Work/Activity: 
Lives alone, Ind in home and community, works, drives, etc. 
   
Number of Personal Factors/Comorbidities that affect the Plan of Care: 1-2: [address] EXAMINATION:  
Most Recent Physical Functioning:  
Gross Assessment: 
  
         
  
Posture: 
  
Balance: 
Sitting: Intact Sitting - Static: Good (unsupported) Sitting - Dynamic: Good (unsupported) Standing: Impaired Standing - Static: Good Standing - Dynamic : Good Bed Mobility: 
  
Wheelchair Mobility: 
  
Transfers: 
Sit to Stand: Contact guard assistance Stand to Sit: Contact guard assistance Gait: 
Left Side Weight Bearing: As tolerated Distance (ft): 500 Feet (ft) Assistive Device: Gait belt;Walker, rolling Ambulation - Level of Assistance: Contact guard assistance Body Structures Involved: 1. Bones 2. Joints 3. Muscles 4. Ligaments Body Functions Affected: 1. Sensory/Pain 2. Movement Related 3. Skin Related Activities and Participation Affected: 1. Mobility 2. Self Care 3. Domestic Life 4. Community, Social and [place] Number of elements that affect the Plan of Care: 4+: HIGH COMPLEXITY CLINICAL PRESENTATION:  
Presentation: Stable and uncomplicated: LOW COMPLEXITY CLINICAL DECISION MAKING:  
[employer] AM-PAC 6 Clicks Basic Mobility Inpatient Short Form How much difficulty does the patient currently have. .. Unable A Lot A Little None 1. Turning over in bed (including adjusting bedclothes, sheets and blankets)? [] 1   [] 2   [x] 3   [] 4  
2. Sitting down on and standing up from a chair with arms ( e.g., wheelchair, bedside commode, etc.)   [] 1   [] 2   [x] 3   [] 4  
3. Moving from lying on back to sitting on the side of the bed? [] 1   [] 2   [x] 3   [] 4 How much help from another person does the patient currently need. .. Total A Lot A Little None 4. Moving to and from a bed to a chair (including a wheelchair)?    [] 1   [] 2   [x] 3   [] 4  
 5.  Need to walk in hospital room? [] 1   [] 2   [x] 3   [] 4  
6. Climbing 3-5 steps with a railing? [] 1   [] 2   [x] 3   [] 4  
© 2007, Trustees of [address] [place] [address] [address] [address], under license to [employer]. All rights reserved Score:  Initial: 18 Most Recent: X (Date: -- ) Interpretation of Tool:  Represents activities that are increasingly more difficult (i.e. Bed mobility, Transfers, Gait). Medical Necessity:    
· Patient is expected to demonstrate progress in strength, range of motion, balance, coordination and functional technique to increase independence with   and improve safety during all functional mobility. Reason for Services/Other Comments: 
· Patient continues to require skilled intervention due to medical complications and patient unable to attend/participate in therapy as expected. Use of outcome tool(s) and clinical judgement create a POC that gives a: Clear prediction of patient's progress: LOW COMPLEXITY  
  
 
 
 
TREATMENT:  
(In addition to Assessment/Re-Assessment sessions the following treatments were rendered) Pre-treatment Symptoms/Complaints:  \" hello again Pain: Initial:  
Pain Intensity 1: 0  Post Session:  0/10 Therapeutic Activity: (    14 minutes): Therapeutic activities including Bed transfers, Chair transfers and education in hip precautions to improve mobility, strength and balance. Required minimal cueing   to promote maintaining hip precautions in bed. Therapeutic Exercise: ( 10 minutes):  Exercises per grid below to improve mobility, strength, balance and coordination. Required minimum  verbal cues to promote proper body breathing techniques. Progressed repetitions and complexity of movement as indicated. Date: 
3/19/19 Date: 
 Date: 
  
ACTIVITY/EXERCISE AM PM AM PM AM PM  
Ankle pumps 30 marching 30 LAQ 30       
        
        
        
        
B = bilateral; AA = active assistive; A = active; P = passive Braces/Orthotics/Lines/Etc:  
· IV 
· O2 Device: Room air Treatment/Session Assessment:   
· Response to Treatment:  Pleasant and cooperative. Increased gait distance · Interdisciplinary Collaboration:  
o Physical Therapy Assistant 
o Registered Nurse · After treatment position/precautions:  
o Up in chair 
o Bed/Chair-wheels locked 
o Call light within reach 
o RN notified · Compliance with Program/Exercises: Compliant all of the time · Recommendations/Intent for next treatment session: \"Next visit will focus on advancements to more challenging activities and reduction in assistance provided\". Total Treatment Duration: PT Patient Time In/Time Out Time In: 9378 Time Out: 1104 Felipe [place], PTA

## 2019-03-20 VITALS
OXYGEN SATURATION: 94 % | HEART RATE: 80 BPM | RESPIRATION RATE: 18 BRPM | TEMPERATURE: 98.6 F | HEIGHT: 77 IN | DIASTOLIC BLOOD PRESSURE: 75 MMHG | BODY MASS INDEX: 23.08 KG/M2 | WEIGHT: 195.5 LBS | SYSTOLIC BLOOD PRESSURE: 138 MMHG

## 2019-03-20 LAB
MM INDURATION POC: 0 MM (ref 0–5)
PPD POC: NEGATIVE NEGATIVE

## 2019-03-20 PROCEDURE — 74011250637 HC RX REV CODE- 250/637: Performed by: ORTHOPAEDIC SURGERY

## 2019-03-20 PROCEDURE — 97535 SELF CARE MNGMENT TRAINING: CPT

## 2019-03-20 PROCEDURE — 97110 THERAPEUTIC EXERCISES: CPT

## 2019-03-20 PROCEDURE — 74011250636 HC RX REV CODE- 250/636: Performed by: ORTHOPAEDIC SURGERY

## 2019-03-20 PROCEDURE — 97530 THERAPEUTIC ACTIVITIES: CPT

## 2019-03-20 RX ORDER — HYDROCODONE BITARTRATE AND ACETAMINOPHEN 5; 325 MG/1; MG/1
1 TABLET ORAL
Qty: 30 TAB | Refills: 0 | Status: SHIPPED | OUTPATIENT
Start: 2019-03-20 | End: 2019-03-23

## 2019-03-20 RX ADMIN — ASPIRIN 325 MG: 325 TABLET, DELAYED RELEASE ORAL at 08:23

## 2019-03-20 RX ADMIN — ENOXAPARIN SODIUM 40 MG: 40 INJECTION SUBCUTANEOUS at 08:23

## 2019-03-20 RX ADMIN — Medication 1 AMPULE: at 08:23

## 2019-03-20 RX ADMIN — CELECOXIB 200 MG: 200 CAPSULE ORAL at 08:23

## 2019-03-20 NOTE — PROGRESS NOTES
Notified Mena Stearns NP that pt is feeling constipated. Received order to give 2 colace caps once. Will continue to monitor.

## 2019-03-20 NOTE — PROGRESS NOTES
Pt's D/C instructions completed. Verbalized understanding of all instructions including diet, activity, s/sx to alert MD, medications, wound care, and f/u appointment. Patient to have dressing changed prior to being discharged, nurse aware.

## 2019-03-20 NOTE — PROGRESS NOTES
Problem: Mobility Impaired (Adult and Pediatric) Goal: *Acute Goals and Plan of Care (Insert Text) Short Term Goals: 1.  Mr. Vj Montaño will move from supine to sit and sit to supine in flat bed with INDEPENDENT within 3 day(s). Goal met 3/20/19 2. Mr. Vj Montaño will transfer from bed to/from chair using RW with MODIFIED INDEPENDENCE  within 3 days. goal met 3/20/19 3. Mr. Vj Montaño will ambulate with MODIFIED INDEPENDENCE for 250+ feet with the least restrictive device within 3 day(s). Goal met 3/20/19 4. Mr. Vj Montaño will verbalize 3/3 hip precautions to ensure back safety during functional activities within 3 days. Goal met 3/20/19 
________________________________________________________________________ PHYSICAL THERAPY: Daily Note and AM 3/20/2019INPATIENT: PT Visit Days : 3 Payor: Alberta Maher / [place] BLUE CR[place] BLUE ESSENTIALS BRANDI / Product Type: BRANDI /   
  
NAME/AGE/GENDER: Reeta Kawasaki is a 63 y.o. male PRIMARY DIAGNOSIS: Primary osteoarthritis of left hip [M16.12] DJD (degenerative joint disease), multiple sites [M15.9] <principal problem not specified> <principal problem not specified> Procedure(s) (LRB): HIP ARTHROPLASTY TOTAL/ LEFT/ GOMEZ AND NEPHEW (Left) 2 Days Post-Op ICD-10: Treatment Diagnosis:  
 · Other abnormalities of gait and mobility (R26.89) Precaution/Allergies: 
Iodine ASSESSMENT:  
Mr. Vj Montaño presents walking in the hallway and agreeable to therapy. He underwent R KATHIE a few months ago. No c/o pain. Gait training with rolling walker x 2400 feet+ with a slightly fast gait. Verbal cues for caution and safety however the patient appears to be doing fine. Patient is returned to the room and participates in therapeutic exercises for LLE strengthening. Tolerated well. Patient is making good progress with goals and has met all the short term goals. The patient is very pleasant and cooperative. Will recover well.     Mr. Vj Montaño would benefit from skilled physical therapy (medically necessary) to address his deficits and maximize his function. Will continue PT efforts. Possible discharge home today. This section established at most recent assessment PROBLEM LIST (Impairments causing functional limitations): 1. Decreased ADL/Functional Activities 2. Decreased Transfer Abilities 3. Decreased Ambulation Ability/Technique 4. Increased Pain 5. Decreased Activity Tolerance 6. Decreased Knowledge of Precautions INTERVENTIONS PLANNED: (Benefits and precautions of physical therapy have been discussed with the patient.) 1. Balance Exercise 2. Bed Mobility 3. Gait Training 4. Therapeutic Activites 5. Therapeutic Exercise/Strengthening 6. Transfer Training 7. education TREATMENT PLAN: Frequency/Duration: twice daily for 1 week Rehabilitation Potential For Stated Goals: Excellent RECOMMENDED REHABILITATION/EQUIPMENT: (at time of discharge pending progress): Due to the probability of continued deficits (see above) this patient will not likely need continued skilled physical therapy after discharge. Equipment:  
? None at this time HISTORY:  
History of Present Injury/Illness (Reason for Referral): Admitted for above. Past Medical History/Comorbidities:  
Mr. Tiffanie Beatty  has a past medical history of Arthritis, Asthma, Chronic pain (2000), DJD (degenerative joint disease) (01/2019), GERD (gastroesophageal reflux disease), Impetigo, unspecified, Kidney stones, and PUD (peptic ulcer disease). Mr. Tiffanie Beatty  has a past surgical history that includes hx tonsil and adenoidectomy; hx adenoidectomy; hx tonsillectomy; and pr total hip arthroplasty (Right, 01/2019). Social History/Living Environment:  
Home Environment: Private residence # Steps to Enter: 1 One/Two Story Residence: One story Living Alone: Yes Support Systems: Family member(s) Patient Expects to be Discharged to[de-identified] Private residence Current DME Used/Available at Home: Adaptive dressing aides, Adaptive bathing aides, Jasmyn Gu, rolling Prior Level of Function/Work/Activity: 
Lives alone, Ind in home and community, works, drives, etc. 
   
Number of Personal Factors/Comorbidities that affect the Plan of Care: 1-2: [address] [facility] [address] EXAMINATION:  
Most Recent Physical Functioning:  
Gross Assessment: 
  
         
  
Posture: 
  
Balance: 
Sitting: Intact Sitting - Static: Good (unsupported) Sitting - Dynamic: Good (unsupported) Standing: Intact Standing - Static: Good Standing - Dynamic : Good Bed Mobility: 
  
Wheelchair Mobility: 
  
Transfers: 
Sit to Stand: Independent Stand to Sit: Independent Gait: 
Left Side Weight Bearing: As tolerated Speed/Leny: Fluctuations Distance (ft): 2400 Feet (ft) Assistive Device: Gait belt;Walker, rolling Ambulation - Level of Assistance: Stand-by assistance Body Structures Involved: 1. Bones 2. Joints 3. Muscles 4. Ligaments Body Functions Affected: 1. Sensory/Pain 2. Movement Related 3. Skin Related Activities and Participation Affected: 1. Mobility 2. Self Care 3. Domestic Life 4. Community, Social and [place] Number of elements that affect the Plan of Care: 4+: HIGH COMPLEXITY CLINICAL PRESENTATION:  
Presentation: Stable and uncomplicated: LOW COMPLEXITY CLINICAL DECISION MAKING:  
[facility] [place] [facility] [place] 6 Clicks Basic Mobility Inpatient Short Form How much difficulty does the patient currently have. .. Unable A Lot A Little None 1. Turning over in bed (including adjusting bedclothes, sheets and blankets)? [] 1   [] 2   [x] 3   [] 4  
2. Sitting down on and standing up from a chair with arms ( e.g., wheelchair, bedside commode, etc.)   [] 1   [] 2   [x] 3   [] 4  
3. Moving from lying on back to sitting on the side of the bed? [] 1   [] 2   [x] 3   [] 4 How much help from another person does the patient currently need. .. Total A Lot A Little None 4. Moving to and from a bed to a chair (including a wheelchair)? [] 1   [] 2   [x] 3   [] 4  
5. Need to walk in hospital room? [] 1   [] 2   [x] 3   [] 4  
6. Climbing 3-5 steps with a railing? [] 1   [] 2   [x] 3   [] 4  
© 2007, Trustees of [facility], under license to [employer]. All rights reserved Score:  Initial: 18 Most Recent: X (Date: -- ) Interpretation of Tool:  Represents activities that are increasingly more difficult (i.e. Bed mobility, Transfers, Gait). Medical Necessity:    
· Patient is expected to demonstrate progress in strength, range of motion, balance, coordination and functional technique to increase independence with   and improve safety during all functional mobility. Reason for Services/Other Comments: 
· Patient continues to require skilled intervention due to medical complications and patient unable to attend/participate in therapy as expected. Use of outcome tool(s) and clinical judgement create a POC that gives a: Clear prediction of patient's progress: LOW COMPLEXITY  
  
 
 
 
TREATMENT:  
(In addition to Assessment/Re-Assessment sessions the following treatments were rendered) Pre-treatment Symptoms/Complaints:  \"I just need to get started\" Pain: Initial:  
Pain Intensity 1: 0  Post Session:  0/10 Therapeutic Activity: (    20 minutes): Therapeutic activities including Bed transfers, Chair transfers and education in hip precautions to improve mobility, strength and balance. Required minimal cueing and stand by assist    to promote maintaining hip precautions in bed. Therapeutic Exercise: ( 10 minutes):  Exercises per grid below to improve mobility, strength, balance and coordination. Required minimum  verbal cues to promote proper body breathing techniques. Progressed repetitions and complexity of movement as indicated.  
 
 
 Date: 
3/19/19 Date: 
3/20/19 Date: 
  
ACTIVITY/EXERCISE AM PM AM PM AM PM  
Ankle pumps 30  30     
 marching 30  30 LAQ 30  30     
abduction   30 B = bilateral; AA = active assistive; A = active; P = passive Braces/Orthotics/Lines/Etc:  
· 3/20/19 Treatment/Session Assessment:   
· Response to Treatment:  Pleasant and cooperative. Increased gait distance significantly · Interdisciplinary Collaboration:  
o Physical Therapy Assistant 
o Registered Nurse · After treatment position/precautions:  
o Up in chair 
o Bed/Chair-wheels locked 
o Call light within reach 
o RN notified · Compliance with Program/Exercises: Compliant all of the time · Recommendations/Intent for next treatment session: \"Next visit will focus on advancements to more challenging activities and reduction in assistance provided\". Total Treatment Duration: PT Patient Time In/Time Out Time In: 2381 Time Out: 9785 Felipe Monroe, PTA

## 2019-03-20 NOTE — DISCHARGE SUMMARY
Total Joint Discharge Summary    Patient: Girish Salinas MRN: [id number]  SSN: [id number]    YOB: 1960  Age: 63 y.o. Sex: male      Admit Date: 3/18/2019  Discharge Date:3/20/2019  Admitting Physician: Rodrick Toth MD   Discharge Physician: Rodrick Toth MD   Admission Diagnoses: Primary osteoarthritis of left hip [M16.12]  DJD (degenerative joint disease), multiple sites [M15.9]   Discharge Diagnoses:   Patient Active Problem List   Diagnosis Code    Degenerative joint disease (DJD) of hip M16.9    DJD (degenerative joint disease), multiple sites M15.9     Surgeon: Surgeon(s):  MD Gina Pearce MD   DVT Prophylaxis: Lovenox, ATIF [place]  Postoperative Complications: none detected  Last Hemoglobin:   Lab Results   Component Value Date/Time    HGB 12.0 (L) 03/19/2019 04:10 AM        Wound appears to be healing without any evidence of infection. Physical Therapy started on the day following surgery and progressed to independent ambulation with the aid of a walker. At the time of discharge, patient is able to go up and down stairs and has understanding of precautions needed following surgery. Discharged Disposition: Home    Discharge Instructions:   Anticoagulate with Ecotrin 325 mg orally once a day for 4 weeks   Resume pre-hospital diet             Resume home medications per medical continuation form      Ambulate with walker, appropriate total joint protocol   Follow up in office as scheduled    Call doctor immediately if temperature is greater vyts207.5°F, increased pain, swelling, drainage.    If shortness of breath or chest pain, immediately go to the Emergency Department    Signed By: Rodrick Toth MD     March 20, 2019

## 2019-03-20 NOTE — PROGRESS NOTES
Problem: Self Care Deficits Care Plan (Adult) Goal: *Acute Goals and Plan of Care (Insert Text) 1. Patient will verbalize and demonstrate understanding of hip precautions with 100% accuracy during ADL. 2. Patient will complete functional transfers with modified independence and adaptive equipment as needed. GOAL MET 3. Patient will complete lower body bathing and dressing with setup and adaptive equipment as needed. 4. Patient will complete toileting with modified independence. 5. Patient will tolerate at least 20 minutes of BUE therapeutic exercises to increase strength in BUE to aid in functional transfers. 6. Patient will tolerate at least 20 minutes of OT treatment with no rest breaks to increase activity tolerance for ADLs. Timeframe: 7 visits OCCUPATIONAL THERAPY: Daily Note 3/20/2019INPATIENT: OT Visit Days: 2 Payor: Martha Villa / [place] BLUE ESSENTIALS BRANDI / Product Type: BRANDI /  
  
NAME/AGE/GENDER: Sami Godinez is a 63 y.o. male PRIMARY DIAGNOSIS:  Primary osteoarthritis of left hip [M16.12] DJD (degenerative joint disease), multiple sites [M15.9] <principal problem not specified> <principal problem not specified> Procedure(s) (LRB): HIP ARTHROPLASTY TOTAL/ LEFT/ GOMEZ AND NEPHEW (Left) 2 Days Post-Op ICD-10: Treatment Diagnosis:  
 · Pain in left hip (M25.552) · Stiffness of Left Hip, Not elsewhere classified (M25.652) Precautions/Allergies: WBAT LLE 
HIP PRECAUTIONS Iodine ASSESSMENT:  
Mr. Glen Nageotte is a 63year old male who is now s/p L KATHIE and WBAT in LLE. Patient had R KATHIE in Jan 2019. He lives alone and had regained independence with ADLs, IADLs, driving, and working after last surgery. He has a reacher, sock aid, etc from previous surgery. 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 3/20/2019: Patient up in chair upon arrival, agreeable to OT treatment. Reports no pain. Able to verbalize all 3 hip precautions. Participated in ADL activity in standing at sink (grooming- washing face, brushing teeth, washing hands) with modified independence and minimal verbal cues to maintain hip precautions. Then practiced functional mobility in hallway with modified independence and RW. Excellent progress. Will continue efforts. This section established at most recent assessment PROBLEM LIST (Impairments causing functional limitations): 1. Decreased ADL/Functional Activities 2. Decreased Transfer Abilities 3. Decreased Ambulation Ability/Technique 4. Decreased Balance 5. Increased Pain 6. Decreased Activity Tolerance 7. Decreased Knowledge of Precautions INTERVENTIONS PLANNED: (Benefits and precautions of occupational therapy have been discussed with the patient.) 1. Activities of daily living training 2. Adaptive equipment training 3. Group therapy 4. Therapeutic activity 5. Therapeutic exercise TREATMENT PLAN: Frequency/Duration: Follow patient 5x/ week to address above goals. Rehabilitation Potential For Stated Goals: Excellent RECOMMENDED REHABILITATION/EQUIPMENT: (at time of discharge pending progress): Due to the probability of continued deficits (see above) this patient will not likely need continued skilled occupational therapy after discharge. Equipment:  
? None at this time OCCUPATIONAL PROFILE AND HISTORY:  
History of Present Injury/Illness (Reason for Referral): S/p above procedure Past Medical History/Comorbidities:  
Mr. Mariano Paredes  has a past medical history of Arthritis, Asthma, Chronic pain (2000), DJD (degenerative joint disease) (01/2019), GERD (gastroesophageal reflux disease), Impetigo, unspecified, Kidney stones, and PUD (peptic ulcer disease). Mr. Mariano Paredes  has a past surgical history that includes hx tonsil and adenoidectomy; hx adenoidectomy; hx tonsillectomy; and pr total hip arthroplasty (Right, 01/2019). Social History/Living Environment:  
Home Environment: Private residence # Steps to Enter: 1 One/Two Story Residence: One story Living Alone: Yes Support Systems: Family member(s) Patient Expects to be Discharged to[de-identified] Private residence Current DME Used/Available at Home: Adaptive dressing aides, Adaptive bathing aides, Nile Duane, rolling Prior Level of Function/Work/Activity: 
 Patient had R KATHIE in Jan 2019. He lives alone and had regained independence with ADLs, IADLs, driving, and working after last surgery. He has a reacher, sock aid, etc from previous surgery. 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 Number of Personal Factors/Comorbidities that affect the Plan of Care: Brief history (0):  LOW COMPLEXITY ASSESSMENT OF OCCUPATIONAL PERFORMANCE[de-identified]  
Activities of Daily Living:  
Basic ADLs (From Assessment) Complex ADLs (From Assessment) Feeding: Setup Oral Facial Hygiene/Grooming: Setup Bathing: Minimum assistance Upper Body Dressing: Setup Lower Body Dressing: Minimum assistance Toileting: Minimum assistance Instrumental ADL Meal Preparation: Moderate assistance Homemaking: Moderate assistance Medication Management: Independent Financial Management: Independent Grooming/Bathing/Dressing Activities of Daily Living Grooming Grooming Assistance: Modified independent(in standing at sink) Washing Face: Modified independent Washing Hands: Modified independent Brushing Teeth: Modified independent Cues: Verbal cues provided Cognitive Retraining Safety/Judgement: Awareness of environment; Fall prevention Bed/Mat Mobility Sit to Stand: Modified independent Stand to Sit: Modified independent Most Recent Physical Functioning:  
Gross Assessment: 
AROM: Within functional limits Strength: Within functional limits Posture: 
  
Balance: 
Sitting: Intact Sitting - Static: Good (unsupported) Sitting - Dynamic: Good (unsupported) Standing: Intact; With support Standing - Static: Good Standing - Dynamic : Good Bed Mobility: 
  
Wheelchair Mobility: 
  
Transfers: 
Sit to Stand: Modified independent Stand to Sit: Modified independent Patient Vitals for the past 6 hrs: 
 BP BP Patient Position SpO2 Pulse 19 0802 139/72 At rest 92 % 77 Mental Status Neurologic State: Alert Orientation Level: Oriented X4 Cognition: Follows commands Perception: Appears intact Perseveration: No perseveration noted Safety/Judgement: Awareness of environment, Fall prevention Physical Skills Involved: 1. Range of Motion 2. Balance 3. Activity Tolerance 4. Pain (acute) Cognitive Skills Affected (resulting in the inability to perform in a timely and safe manner): 1. None  Psychosocial Skills Affected: 1. Habits/Routines 2. Environmental Adaptation 3. Social Roles Number of elements that affect the Plan of Care: 5+:  HIGH COMPLEXITY CLINICAL DECISION MAKIN[address] [place] [id number] AM-PAC 6 Clicks Daily Activity Inpatient Short Form How much help from another person does the patient currently need. .. Total A Lot A Little None 1. Putting on and taking off regular lower body clothing? [] 1   [] 2   [x] 3   [] 4  
2. Bathing (including washing, rinsing, drying)? [] 1   [] 2   [x] 3   [] 4  
3. Toileting, which includes using toilet, bedpan or urinal?   [] 1   [] 2   [x] 3   [] 4  
4. Putting on and taking off regular upper body clothing? [] 1   [] 2   [x] 3   [] 4  
5. Taking care of personal grooming such as brushing teeth? [] 1   [] 2   [x] 3   [] 4  
6. Eating meals? [] 1   [] 2   [x] 3   [] 4  
© , Trustees of [address] [place] [address] [address] [address], under license to [employer]. All rights reserved Score:  Initial: 18 Most Recent: X (Date: -- ) Interpretation of Tool:  Represents activities that are increasingly more difficult (i.e. Bed mobility, Transfers, Gait). Medical Necessity:    
· Patient demonstrates excellent rehab potential due to higher previous functional level. Reason for Services/Other Comments: 
· Patient continues to require present interventions due to patient's inability to care for self while maintaining hip precautions . Use of outcome tool(s) and clinical judgement create a POC that gives a: LOW COMPLEXITY  
 
 
 
TREATMENT:  
(In addition to Assessment/Re-Assessment sessions the following treatments were rendered) Pre-treatment Symptoms/Complaints: NONE Pain: Initial:  
Pain Intensity 1: 0  Post Session:  0 Self Care: (16 minutes): Procedure(s) (per grid) utilized to improve and/or restore self-care/home management as related to grooming and functional mobility of household distances. Required minimal verbal cueing to facilitate compensatory activities and adherence to hip precautions . Braces/Orthotics/Lines/Etc:  
· IV 
· O2 Device: Room air Treatment/Session Assessment:   
· Response to Treatment: Tolerated well · Interdisciplinary Collaboration:  
o Occupational Therapist 
o Registered Nurse · After treatment position/precautions:  
o Up in chair 
o Bed/Chair-wheels locked 
o Bed in low position 
o Call light within reach 
o RN notified 
o Visitors at bedside · Compliance with Program/Exercises: Compliant all of the time. · Recommendations/Intent for next treatment session: \"Next visit will focus on advancements to more challenging activities and reduction in assistance provided\". Total Treatment Duration: OT Patient Time In/Time Out Time In: 4255 Time Out: 1045 Noreen Zimmerman OTR/L

## 2019-03-20 NOTE — PROGRESS NOTES
Problem: Falls - Risk of 
Goal: *Absence of Falls Document Ebb Polish Fall Risk and appropriate interventions in the flowsheet. Outcome: Progressing Towards Goal 
Fall Risk Interventions: 
Mobility Interventions: Communicate number of staff needed for ambulation/transfer, OT consult for ADLs, PT Consult for mobility concerns, Patient to call before getting OOB, Utilize walker, cane, or other assistive device Medication Interventions: Bed/chair exit alarm, Patient to call before getting OOB, Teach patient to arise slowly Elimination Interventions: Call light in reach, Patient to call for help with toileting needs

## 2019-03-20 NOTE — DISCHARGE INSTRUCTIONS
DO NOT remove the dressing on your hip until [address] visits    Ice until swelling subsides      MAY shower. NO TUB BATHING    ACTIVITY Ambulate with walker                    Weight bearing as tolerated      NO driving until cleared by Dr. Vi Tubbs if [phone]):  Fever >100.5             Incision becomes red, swollen or opens up                     Incision has yellow, thick drainage or an odor                    Pain is not managed with prescribed medications                     Excessive nausea and/or vomiting    Report to MD  redness ,swelling or pain  in your calf, back of knee, thigh or groin    Go to emergency room for sudden chest pain and difficulty breathing          Avoid having pets sleep in bed with you until incision is completely healed      Hip Precautions:    Avoid bending leg  past 90 degrees    Avoid twisting  leg in or out    Avoid crossing your legs or ankles    Keep feet pointed straight ahead    No bending at waist      DISCHARGE SUMMARY from Nurse    PATIENT INSTRUCTIONS:    After general anesthesia or intravenous sedation, for 24 hours or while taking prescription Narcotics:  · Limit your activities  · Do not drive and operate hazardous machinery  · Do not make important personal or business decisions  · Do  not drink alcoholic beverages  · If you have not urinated within 8 hours after discharge, please contact your surgeon on call.     Report the following to your surgeon:  · Excessive pain, swelling, redness or odor of or around the surgical area  · Temperature over 100.5  · Nausea and vomiting lasting longer than 4 hours or if unable to take medications  · Any signs of decreased circulation or nerve impairment to extremity: change in color, persistent  numbness, tingling, coldness or increase pain  · Any questions    What to do at Home:  Recommended activity: As directed by PT/OT/MD    If you experience any of the following symptoms fever > 100.5, nausea, vomiting, pain without control of medications, chest pain and/or shortness of breath to ER please follow up with MD.    *  Please give a list of your current medications to your Primary Care Provider. *  Please update this list whenever your medications are discontinued, doses are      changed, or new medications (including over-the-counter products) are added. *  Please carry medication information at all times in case of emergency situations. These are general instructions for a healthy lifestyle:    No smoking/ No tobacco products/ Avoid exposure to second hand smoke  Surgeon General's Warning:  Quitting smoking now greatly reduces serious risk to your health. Obesity, smoking, and sedentary lifestyle greatly increases your risk for illness    A healthy diet, regular physical exercise & weight monitoring are important for maintaining a healthy lifestyle    You may be retaining fluid if you have a history of heart failure or if you experience any of the following symptoms:  Weight gain of 3 pounds or more overnight or 5 pounds in a week, increased swelling in our hands or feet or shortness of breath while lying flat in bed. Please call your doctor as soon as you notice any of these symptoms; do not wait until your next office visit. Recognize signs and symptoms of STROKE:    F-face looks uneven    A-arms unable to move or move unevenly    S-speech slurred or non-existent    T-time-call 911 as soon as signs and symptoms begin-DO NOT go       Back to bed or wait to see if you get better-TIME IS BRAIN. Warning Signs of HEART ATTACK     Call 911 if you have these symptoms:   Chest discomfort. Most heart attacks involve discomfort in the center of the chest that lasts more than a few minutes, or that goes away and comes back. It can feel like uncomfortable pressure, squeezing, fullness, or pain.  Discomfort in other areas of the upper body.  Symptoms can include pain or discomfort in one or both arms, the back, neck, jaw, or stomach.  Shortness of breath with or without chest discomfort.  Other signs may include breaking out in a cold sweat, nausea, or lightheadedness. Don't wait more than five minutes to call 911 - MINUTES MATTER! Fast action can save your life. Calling 911 is almost always the fastest way to get lifesaving treatment. Emergency Medical Services staff can begin treatment when they arrive -- up to an hour sooner than if someone gets to the hospital by car. The discharge information has been reviewed with the patient. The patient verbalized understanding. Discharge medications reviewed with the patient and appropriate educational materials and side effects teaching were provided.   ___________________________________________________________________________________________________________________________________

## 2019-03-20 NOTE — PROGRESS NOTES
The documentation for this period is being entered following the guidelines as defined in the [address] downtime policy by Leonardo Lott RN. 
 
 (0100 until present time)

## 2022-01-01 NOTE — ANESTHESIA PREPROCEDURE EVALUATION
Anesthetic History   No history of anesthetic complications            Review of Systems / Medical History  Patient summary reviewed and pertinent labs reviewed    Pulmonary          Smoker (quit 10 years.)  Asthma        Neuro/Psych   Within defined limits           Cardiovascular                  Exercise tolerance: >4 METS     GI/Hepatic/Renal  Within defined limits              Endo/Other        Arthritis     Other Findings              Physical Exam    Airway  Mallampati: II  TM Distance: 4 - 6 cm  Neck ROM: normal range of motion   Mouth opening: Normal     Cardiovascular  Regular rate and rhythm,  S1 and S2 normal,  no murmur, click, rub, or gallop  Rhythm: regular  Rate: normal         Dental    Dentition: Caps/crowns     Pulmonary  Breath sounds clear to auscultation               Abdominal         Other Findings            Anesthetic Plan    ASA: 2  Anesthesia type: spinal            Anesthetic plan and risks discussed with: Patient
Passed

## (undated) DEVICE — SUTURE VCRL SZ 0 L27IN ABSRB UD L36MM CP-1 1/2 CIR REV CUT J267H

## (undated) DEVICE — GOWN,REINFORCED,POLY,AURORA,XXLARGE,STR: Brand: MEDLINE

## (undated) DEVICE — BASIC SINGLE BASIN-LF: Brand: MEDLINE INDUSTRIES, INC.

## (undated) DEVICE — STRYKER PERFORMANCE SERIES SAGITTAL BLADE: Brand: STRYKER PERFORMANCE SERIES

## (undated) DEVICE — (D)PREP SKN CHLRAPRP APPL 26ML -- CONVERT TO ITEM 371833

## (undated) DEVICE — 1840 FOAM BLOCK NEEDLE COUNTER: Brand: DEVON

## (undated) DEVICE — REM POLYHESIVE ADULT PATIENT RETURN ELECTRODE: Brand: VALLEYLAB

## (undated) DEVICE — SUTURE VCRL SZ 2-0 L27IN ABSRB UD L36MM CP-1 1/2 CIR REV J266H

## (undated) DEVICE — SLIM BODY SKIN STAPLER: Brand: APPOSE ULC

## (undated) DEVICE — SUTURE VCRL SZ 1 L27IN ABSRB UD L36MM CP-1 1/2 CIR REV CUT J268H

## (undated) DEVICE — SURGICAL PROCEDURE PACK TOT HIP CDS

## (undated) DEVICE — DRAPE,HIP,W/POUCHES,STERILE: Brand: MEDLINE

## (undated) DEVICE — ABDOMINAL PAD: Brand: DERMACEA

## (undated) DEVICE — SOLUTION IRRIG 3000ML 0.9% SOD CHL FLX CONT 0797208] ICU MEDICAL INC]

## (undated) DEVICE — DRAPE TBL W72XH34IN D30IN SGL PC DISPOSABLE

## (undated) DEVICE — RETRIEVER SUT ARTHSCP HOFFEE --

## (undated) DEVICE — T4 HOOD

## (undated) DEVICE — BASIC SINGLE BASIN BTC-LF: Brand: MEDLINE INDUSTRIES, INC.

## (undated) DEVICE — HANDPIECE SET WITH COAXIAL HIGH FLOW TIP AND SUCTION TUBE: Brand: INTERPULSE

## (undated) DEVICE — 3M™ COBAN™ SELF-ADHERENT WRAP, 1586S, STERILE, 6 IN X 5 YD (15 CM X 4,5 M), 12 ROLLS/CASE: Brand: 3M™ COBAN™

## (undated) DEVICE — INTENDED FOR TISSUE SEPARATION, AND OTHER PROCEDURES THAT REQUIRE A SHARP SURGICAL BLADE TO PUNCTURE OR CUT.: Brand: BARD-PARKER SAFETY BLADES SIZE 15, STERILE

## (undated) DEVICE — DRAPE SHT 3 QTR PROXIMA 53X77 --

## (undated) DEVICE — BUTTON SWITCH PENCIL BLADE ELECTRODE, HOLSTER: Brand: EDGE

## (undated) DEVICE — X-RAY SPONGES,12 PLY: Brand: DERMACEA

## (undated) DEVICE — SYR 50ML LR LCK 1ML GRAD NSAF --

## (undated) DEVICE — SUTURE FIBERWIRE SZ 2 W/ TAPERED NEEDLE BLUE L38IN NONABSORB BLU L26.5MM 1/2 CIRCLE AR7200

## (undated) DEVICE — MEDI-VAC YANKAUER SUCTION HANDLE W/BULBOUS TIP: Brand: CARDINAL HEALTH

## (undated) DEVICE — INTENDED FOR TISSUE SEPARATION, AND OTHER PROCEDURES THAT REQUIRE A SHARP SURGICAL BLADE TO PUNCTURE OR CUT.: Brand: BARD-PARKER SAFETY BLADES SIZE 10, STERILE

## (undated) DEVICE — OCCLUSIVE GAUZE STRIP,3% BISMUTH TRIBROMOPHENATE IN PETROLATUM BLEND: Brand: XEROFORM

## (undated) DEVICE — DRAPE,U/SHT,SPLIT,FILM,60X84,STERILE: Brand: MEDLINE

## (undated) DEVICE — FAN SPRAY KIT: Brand: PULSAVAC®

## (undated) DEVICE — SYR BULB 60ML IRRIGATION -- CONVERT TO ITEM 116413

## (undated) DEVICE — DUAL CUT SAGITTAL BLADE

## (undated) DEVICE — BIPOLAR SEALER 23-112-1 AQM 6.0: Brand: AQUAMANTYS ®